# Patient Record
Sex: MALE | Race: WHITE | NOT HISPANIC OR LATINO | Employment: UNEMPLOYED | URBAN - NONMETROPOLITAN AREA
[De-identification: names, ages, dates, MRNs, and addresses within clinical notes are randomized per-mention and may not be internally consistent; named-entity substitution may affect disease eponyms.]

---

## 2022-07-06 ENCOUNTER — OFFICE VISIT (OUTPATIENT)
Dept: FAMILY MEDICINE | Facility: CLINIC | Age: 37
End: 2022-07-06
Payer: OTHER GOVERNMENT

## 2022-07-06 VITALS
HEIGHT: 74 IN | WEIGHT: 179 LBS | SYSTOLIC BLOOD PRESSURE: 130 MMHG | BODY MASS INDEX: 22.97 KG/M2 | DIASTOLIC BLOOD PRESSURE: 80 MMHG | TEMPERATURE: 98 F | OXYGEN SATURATION: 99 % | HEART RATE: 77 BPM

## 2022-07-06 DIAGNOSIS — D17.0 LIPOMA OF NECK: Primary | ICD-10-CM

## 2022-07-06 DIAGNOSIS — M67.442 GANGLION CYST OF TENDON SHEATH OF LEFT HAND: ICD-10-CM

## 2022-07-06 DIAGNOSIS — F43.10 PTSD (POST-TRAUMATIC STRESS DISORDER): ICD-10-CM

## 2022-07-06 PROCEDURE — 99203 OFFICE O/P NEW LOW 30 MIN: CPT | Mod: ,,, | Performed by: NURSE PRACTITIONER

## 2022-07-06 PROCEDURE — 99203 PR OFFICE/OUTPT VISIT, NEW, LEVL III, 30-44 MIN: ICD-10-PCS | Mod: ,,, | Performed by: NURSE PRACTITIONER

## 2022-07-06 RX ORDER — OMEPRAZOLE 20 MG/1
20 CAPSULE, DELAYED RELEASE ORAL DAILY
COMMUNITY
Start: 2022-02-16 | End: 2023-04-12 | Stop reason: SDUPTHER

## 2022-07-06 RX ORDER — METHOCARBAMOL 500 MG/1
500 TABLET, FILM COATED ORAL DAILY PRN
COMMUNITY
Start: 2022-05-03 | End: 2023-04-12 | Stop reason: SDUPTHER

## 2022-07-06 RX ORDER — GABAPENTIN 600 MG/1
1 TABLET ORAL NIGHTLY
COMMUNITY
Start: 2022-05-03 | End: 2023-04-12 | Stop reason: SDUPTHER

## 2022-07-06 RX ORDER — SILDENAFIL 100 MG/1
100 TABLET, FILM COATED ORAL
COMMUNITY
Start: 2022-05-02 | End: 2022-12-28 | Stop reason: SDUPTHER

## 2022-07-06 RX ORDER — VALACYCLOVIR HYDROCHLORIDE 500 MG/1
TABLET, FILM COATED ORAL
COMMUNITY
Start: 2022-02-23 | End: 2022-12-28 | Stop reason: SDUPTHER

## 2022-07-06 RX ORDER — CARBAMAZEPINE 200 MG/1
1 TABLET ORAL 2 TIMES DAILY
COMMUNITY
Start: 2021-11-10 | End: 2022-12-28 | Stop reason: SDUPTHER

## 2022-07-07 NOTE — PROGRESS NOTES
JUAN Sifuentes   [facility]  [id number] [place] [address]  [phone]      PATIENT NAME: Garrett Wright  : 1985  DATE: 22  MRN: [id number]      Billing Provider: JUAN Sifuentes  Level of Service:   Patient PCP Information     Provider PCP Type    JUAN Sifuentes General          Reason for Visit / Chief Complaint: Establish Care, Hand Pain (L hand cyst ), and Referral (Surgeon in [place] for cyst on hand and psychologist for ptsd )       Update PCP  Update Chief Complaint         History of Present Illness / Problem Focused Workflow       37 year old male presents with complaint of cyst to left hand near thumb for several months   He also has lipoma of the left side of neck   Reports cyst is causing pain with movement   He has been seen by surgeon in [place] who has agreed to remove cyst  Also wants referral for psychology related to PTSD    Hx of PTSD, daily smoker      Review of Systems     Review of Systems   Constitutional: Negative for fatigue and fever.   HENT: Negative for congestion.    Eyes: Negative for visual disturbance.   Respiratory: Negative for cough and shortness of breath.    Cardiovascular: Negative for chest pain.   Gastrointestinal: Negative for abdominal pain, diarrhea and nausea.   Endocrine: Negative for cold intolerance and heat intolerance.   Musculoskeletal: Negative for gait problem.   Neurological: Negative for dizziness, weakness and headaches.   Psychiatric/Behavioral: Positive for sleep disturbance. The patient is nervous/anxious.        Medical / Social / Family History     Past Medical History:   Diagnosis Date    PTSD (post-traumatic stress disorder)        History reviewed. No pertinent surgical history.    Social History    reports that he has been smoking. He has been smoking about 0.25 packs per day. He has never used smokeless tobacco. He reports previous alcohol use. He reports current drug use.  Drug: Marijuana.    Family History  MrBandar's family history includes Cancer in his mother.    Medications and Allergies     Medications  Outpatient Medications Marked as Taking for the 7/6/22 encounter (Office Visit) with JUAN Sifuentes   Medication Sig Dispense Refill    carBAMazepine (TEGRETOL) 200 mg tablet Take 1 tablet by mouth 2 (two) times daily.      gabapentin (NEURONTIN) 600 MG tablet Take 1 tablet by mouth nightly.      sildenafiL (VIAGRA) 100 MG tablet Take 100 mg by mouth.      valACYclovir (VALTREX) 500 MG tablet TAKE ONE TABLET BY MOUTH DAILY FOR VIRAL INFECTION         Allergies  Review of patient's allergies indicates:  No Known Allergies    Physical Examination     Vitals:    07/06/22 1443   BP: 130/80   Pulse: 77   Temp: 98 °F (36.7 °C)     Physical Exam  HENT:      Head: Normocephalic.      Nose: Nose normal. No congestion.      Mouth/Throat:      Mouth: Mucous membranes are moist.   Neck:      Comments: Lipoma of left side of neck  Pulmonary:      Effort: Pulmonary effort is normal. No respiratory distress.   Abdominal:      General: Bowel sounds are normal.      Palpations: Abdomen is soft.   Musculoskeletal:         General: Swelling present. No tenderness. Normal range of motion.      Cervical back: Neck supple.      Comments: Pain to left hand; cyst near thumb   Skin:     General: Skin is warm.   Neurological:      Mental Status: He is alert and oriented to person, place, and time.           Imaging / Labs     No visits with results within 1 Day(s) from this visit.   Latest known visit with results is:   No results found for any previous visit.     No image results found.      Assessment and Plan (including Health Maintenance)      Problem List  Smart Sets  Document Outside HM   :    Health Maintenance Due   Topic Date Due    Hepatitis C Screening  Never done    Lipid Panel  Never done    COVID-19 Vaccine (1) Never done    HIV Screening  Never done    TETANUS VACCINE  Never done     Pneumococcal Vaccines (Age 0-64) (2 - PCV) 03/12/2005       Problem List Items Addressed This Visit        Oncology    Lipoma of neck - Primary    Relevant Orders    Ambulatory referral/consult to General Surgery       Orthopedic    Ganglion cyst of tendon sheath of left hand    Relevant Orders    Ambulatory referral/consult to General Surgery        referral for general surgery and psychology  Follow up as needed    Health Maintenance Topics with due status: Not Due       Topic Last Completion Date    Influenza Vaccine 09/07/2020             Signature:  JUAN Sifuentes  [address] [address]  [phone]    Date of encounter: 7/6/22

## 2022-08-30 ENCOUNTER — OFFICE VISIT (OUTPATIENT)
Dept: SURGERY | Facility: CLINIC | Age: 37
End: 2022-08-30
Attending: SURGERY
Payer: OTHER GOVERNMENT

## 2022-08-30 ENCOUNTER — HOSPITAL ENCOUNTER (OUTPATIENT)
Dept: RADIOLOGY | Facility: HOSPITAL | Age: 37
Discharge: HOME | End: 2022-08-30
Attending: ORTHOPAEDIC SURGERY
Payer: OTHER GOVERNMENT

## 2022-08-30 DIAGNOSIS — D17.0 LIPOMA OF NECK: ICD-10-CM

## 2022-08-30 DIAGNOSIS — M67.442 GANGLION CYST OF TENDON SHEATH OF LEFT HAND: Primary | ICD-10-CM

## 2022-08-30 DIAGNOSIS — M67.442 GANGLION CYST OF TENDON SHEATH OF LEFT HAND: ICD-10-CM

## 2022-08-30 PROCEDURE — 99203 OFFICE O/P NEW LOW 30 MIN: CPT | Mod: S$PBB,,, | Performed by: SURGERY

## 2022-08-30 PROCEDURE — 99212 OFFICE O/P EST SF 10 MIN: CPT | Mod: PBBFAC | Performed by: SURGERY

## 2022-08-30 PROCEDURE — 99203 PR OFFICE/OUTPT VISIT, NEW, LEVL III, 30-44 MIN: ICD-10-PCS | Mod: S$PBB,,, | Performed by: SURGERY

## 2022-08-30 PROCEDURE — 73130 X-RAY EXAM OF HAND: CPT | Mod: TC,LT

## 2022-08-31 NOTE — PROGRESS NOTES
Subjective:       Patient ID: Garrett Wright is a 37 y.o. male.    Chief Complaint: Cyst (Left neck, left hand and right elbow)    37-year-old male patient who presents with complaint of left hand swelling near the thenar eminence on both the palmar and dorsal side.  He states that it limits his mobility of the thumb and use of the right hand.  The swelling has been present for several months, and he wishes to have something done with it.  [facility] though the primary reason for visit with me was stated to be a soft tissue swelling of the neck.    Cyst    family history includes Cancer in his mother.  Past Medical History:   Diagnosis Date    PTSD (post-traumatic stress disorder)       History reviewed. No pertinent surgical history.    reports that he has been smoking. He has been smoking an average of .25 packs per day. He has never used smokeless tobacco. He reports that he does not currently use alcohol. He reports current drug use. Drug: Marijuana.     Review of Systems   All other systems reviewed and are negative.       Objective:      There were no vitals taken for this visit.   Physical Exam  Constitutional:       General: He is not in acute distress.     Appearance: He is normal weight.   HENT:      Nose: Nose normal.   Eyes:      General: No scleral icterus.  Cardiovascular:      Rate and Rhythm: Normal rate and regular rhythm.      Pulses: Normal pulses.   Pulmonary:      Breath sounds: Normal breath sounds.   Abdominal:      General: There is no distension.      Palpations: Abdomen is soft. There is no mass.      Hernia: No hernia is present.   Musculoskeletal:         General: No swelling or tenderness.      Comments: Swelling of the thenar eminence toward the wrist and carpometacarpal joint of the thumb.  There is some swelling noted on the dorsum of the hand in this location as well.   Lymphadenopathy:      Cervical: No cervical adenopathy.   Skin:     Comments: 1.5  cm sebaceous cyst left posterior neck   Neurological:      General: No focal deficit present.      Mental Status: He is alert.      Motor: No weakness.   Psychiatric:         Mood and Affect: Mood normal.       Assessment/Plan:         Ganglion cyst of tendon sheath of left hand    Lipoma of neck         Problem List Items Addressed This Visit          Oncology    Lipoma of neck    Current Assessment & Plan     Can be addressed when he has surgery on hand            Orthopedic    Ganglion cyst of tendon sheath of left hand - Primary    Current Assessment & Plan     Refer to Ortho for further management

## 2022-11-28 ENCOUNTER — TELEPHONE (OUTPATIENT)
Dept: FAMILY MEDICINE | Facility: CLINIC | Age: 37
End: 2022-11-28
Payer: OTHER GOVERNMENT

## 2022-11-28 NOTE — TELEPHONE ENCOUNTER
----- Message from Radha King sent at 11/28/2022  3:29 PM CST -----  Pt need VALACYCLOVIR. NEED IT CALLED TO [facility]. HE NEED IT ASAP. IF ANY QUESTION CALL [phone].

## 2022-12-28 ENCOUNTER — OFFICE VISIT (OUTPATIENT)
Dept: FAMILY MEDICINE | Facility: CLINIC | Age: 37
End: 2022-12-28
Payer: OTHER GOVERNMENT

## 2022-12-28 VITALS
BODY MASS INDEX: 28.76 KG/M2 | HEIGHT: 73 IN | SYSTOLIC BLOOD PRESSURE: 130 MMHG | OXYGEN SATURATION: 98 % | DIASTOLIC BLOOD PRESSURE: 70 MMHG | WEIGHT: 217 LBS | HEART RATE: 109 BPM | TEMPERATURE: 99 F | RESPIRATION RATE: 20 BRPM

## 2022-12-28 DIAGNOSIS — R09.81 HEAD CONGESTION: ICD-10-CM

## 2022-12-28 DIAGNOSIS — Z76.0 ENCOUNTER FOR MEDICATION REFILL: ICD-10-CM

## 2022-12-28 DIAGNOSIS — B34.9 VIRAL DISEASE: ICD-10-CM

## 2022-12-28 DIAGNOSIS — N52.9 ERECTILE DYSFUNCTION, UNSPECIFIED ERECTILE DYSFUNCTION TYPE: ICD-10-CM

## 2022-12-28 DIAGNOSIS — M62.838 MUSCLE SPASM: ICD-10-CM

## 2022-12-28 DIAGNOSIS — Z13.220 ENCOUNTER FOR SCREENING FOR LIPID DISORDER: ICD-10-CM

## 2022-12-28 DIAGNOSIS — J32.9 OTHER SINUSITIS, UNSPECIFIED CHRONICITY: Primary | ICD-10-CM

## 2022-12-28 PROCEDURE — 99213 PR OFFICE/OUTPT VISIT, EST, LEVL III, 20-29 MIN: ICD-10-PCS | Mod: 25,,, | Performed by: NURSE PRACTITIONER

## 2022-12-28 PROCEDURE — 99213 OFFICE O/P EST LOW 20 MIN: CPT | Mod: 25,,, | Performed by: NURSE PRACTITIONER

## 2022-12-28 PROCEDURE — 96372 PR INJECTION,THERAP/PROPH/DIAG2ST, IM OR SUBCUT: ICD-10-PCS | Mod: ,,, | Performed by: NURSE PRACTITIONER

## 2022-12-28 PROCEDURE — 96372 THER/PROPH/DIAG INJ SC/IM: CPT | Mod: ,,, | Performed by: NURSE PRACTITIONER

## 2022-12-28 RX ORDER — CARBAMAZEPINE 200 MG/1
200 TABLET ORAL 2 TIMES DAILY
Qty: 180 TABLET | Refills: 1 | Status: SHIPPED | OUTPATIENT
Start: 2022-12-28 | End: 2023-04-12

## 2022-12-28 RX ORDER — SILDENAFIL 100 MG/1
100 TABLET, FILM COATED ORAL
Qty: 30 TABLET | Refills: 2 | Status: SHIPPED | OUTPATIENT
Start: 2022-12-28 | End: 2023-04-12 | Stop reason: SDUPTHER

## 2022-12-28 RX ORDER — DEXAMETHASONE SODIUM PHOSPHATE 4 MG/ML
4 INJECTION, SOLUTION INTRA-ARTICULAR; INTRALESIONAL; INTRAMUSCULAR; INTRAVENOUS; SOFT TISSUE
Status: COMPLETED | OUTPATIENT
Start: 2022-12-28 | End: 2022-12-28

## 2022-12-28 RX ORDER — AZITHROMYCIN 250 MG/1
TABLET, FILM COATED ORAL
Qty: 6 TABLET | Refills: 0 | Status: SHIPPED | OUTPATIENT
Start: 2022-12-28 | End: 2023-01-02

## 2022-12-28 RX ORDER — CEFTRIAXONE 1 G/1
1 INJECTION, POWDER, FOR SOLUTION INTRAMUSCULAR; INTRAVENOUS
Status: COMPLETED | OUTPATIENT
Start: 2022-12-28 | End: 2022-12-28

## 2022-12-28 RX ORDER — VALACYCLOVIR HYDROCHLORIDE 500 MG/1
500 TABLET, FILM COATED ORAL DAILY
Qty: 90 TABLET | Refills: 1 | Status: SHIPPED | OUTPATIENT
Start: 2022-12-28 | End: 2023-04-12 | Stop reason: SDUPTHER

## 2022-12-28 RX ADMIN — DEXAMETHASONE SODIUM PHOSPHATE 4 MG: 4 INJECTION, SOLUTION INTRA-ARTICULAR; INTRALESIONAL; INTRAMUSCULAR; INTRAVENOUS; SOFT TISSUE at 02:12

## 2022-12-28 RX ADMIN — CEFTRIAXONE 1 G: 1 INJECTION, POWDER, FOR SOLUTION INTRAMUSCULAR; INTRAVENOUS at 02:12

## 2022-12-28 NOTE — PROGRESS NOTES
JUAN Sifuentes   [address] [address]  [phone]      PATIENT NAME: Garrett Wright  : 1985  DATE: 22  MRN: [id number]      Billing Provider: JUAN Sifuentes  Level of Service:   Patient PCP Information       Provider PCP Type    JUAN Sifuentes General            Reason for Visit / Chief Complaint: Wrist Pain (Left wrist pain./Interested in referral for sx.), Medication Refill, Referral (Requesting referral to immediate care for ptsd psych.), and Nasal Congestion (X2 wks./Denies any other symptoms)       Update PCP  Update Chief Complaint         History of Present Illness / Problem Focused Workflow       37 year old male presents for medication refills  Father requests referral to immediate care for ptsd   Complaints of head and nasal congestion for several weeks  He has been in home of kate in [place], [place]  Reports he has been doing well while staying there; he is now home with family    He has appointment in [place] for follow up and treatment related to left wrist    Hx of PTSD, daily smoker      Review of Systems     Review of Systems   Constitutional:  Negative for fatigue and fever.   HENT:  Negative for congestion.    Eyes:  Negative for visual disturbance.   Respiratory:  Negative for cough and shortness of breath.    Cardiovascular:  Negative for chest pain.   Gastrointestinal:  Negative for abdominal pain, diarrhea and nausea.   Endocrine: Negative for cold intolerance and heat intolerance.   Musculoskeletal:  Negative for gait problem.   Neurological:  Negative for dizziness, weakness and headaches.   Psychiatric/Behavioral:  Positive for sleep disturbance. The patient is nervous/anxious.      Medical / Social / Family History     Past Medical History:   Diagnosis Date    Male erectile dysfunction, unspecified     PTSD (post-traumatic stress disorder)        History reviewed. No pertinent surgical  history.    Social History    reports that he has been smoking cigarettes. He has been smoking an average of .25 packs per day. He has never used smokeless tobacco. He reports that he does not currently use alcohol. He reports current drug use. Drug: Marijuana.    Family History  's family history includes Cancer in his mother.    Medications and Allergies     Medications  Outpatient Medications Marked as Taking for the 12/28/22 encounter (Office Visit) with JUAN Sifuentes   Medication Sig Dispense Refill    methocarbamoL (ROBAXIN) 500 MG Tab Take 500 mg by mouth daily as needed.      [DISCONTINUED] sildenafiL (VIAGRA) 100 MG tablet Take 100 mg by mouth.      [DISCONTINUED] valACYclovir (VALTREX) 500 MG tablet TAKE ONE TABLET BY MOUTH DAILY FOR VIRAL INFECTION       Current Facility-Administered Medications for the 12/28/22 encounter (Office Visit) with JUAN Sifuentes   Medication Dose Route Frequency Provider Last Rate Last Admin    [COMPLETED] cefTRIAXone injection 1 g  1 g Intramuscular 1 time in Clinic/HOD JUAN Sifuentes   1 g at 12/28/22 1400    [COMPLETED] dexAMETHasone injection 4 mg  4 mg Intramuscular 1 time in Clinic/HOD JUAN Sifuentes   4 mg at 12/28/22 1400       Allergies  Review of patient's allergies indicates:  No Known Allergies    Physical Examination     Vitals:    12/28/22 1318   BP: 130/70   Pulse: 109   Resp: 20   Temp: 98.9 °F (37.2 °C)     Physical Exam  HENT:      Head: Normocephalic.      Nose: Nose normal. No congestion.      Mouth/Throat:      Mouth: Mucous membranes are moist.   Neck:      Comments: Lipoma of left side of neck  Pulmonary:      Effort: Pulmonary effort is normal. No respiratory distress.   Abdominal:      General: Bowel sounds are normal.      Palpations: Abdomen is soft.   Musculoskeletal:         General: Swelling present. No tenderness. Normal range of motion.      Cervical back: Neck supple.      Comments: Pain to left hand; cyst near thumb    Skin:     General: Skin is warm.   Neurological:      Mental Status: He is alert and oriented to person, place, and time.         Imaging / Labs     No visits with results within 1 Day(s) from this visit.   Latest known visit with results is:   No results found for any previous visit.     X-Ray Hand 3 view Left  See Procedure Notes for results.     IMPRESSION: Please see Ortho procedure notes for report.      This procedure was auto-finalized by: Virtual Radiologist      Assessment and Plan (including Health Maintenance)      Problem List  Smart Sets  Document Outside HM   :    Health Maintenance Due   Topic Date Due    Hepatitis C Screening  Never done    Lipid Panel  Never done    COVID-19 Vaccine (1) Never done    HIV Screening  Never done    TETANUS VACCINE  Never done    Pneumococcal Vaccines (Age 0-64) (2 - PCV) 03/12/2005    Influenza Vaccine (1) 09/01/2022       Problem List Items Addressed This Visit    None  Visit Diagnoses       Other sinusitis, unspecified chronicity    -  Primary    Relevant Medications    cefTRIAXone injection 1 g (Completed)    dexAMETHasone injection 4 mg (Completed)    Erectile dysfunction, unspecified erectile dysfunction type        Relevant Medications    sildenafiL (VIAGRA) 100 MG tablet    Other Relevant Orders    CBC Auto Differential    Comprehensive Metabolic Panel    Viral disease        Relevant Medications    valACYclovir (VALTREX) 500 MG tablet    Muscle spasm        Relevant Medications    carBAMazepine (TEGRETOL) 200 mg tablet    Encounter for screening for lipid disorder        Relevant Orders    Lipid Panel    Head congestion        Relevant Medications    dexAMETHasone injection 4 mg (Completed)    azithromycin (Z-JEMMA) 250 MG tablet    Encounter for medication refill            Treat for sinusitis today  Medication refills  Will send referral after patient or father gets information for internal med clinic  Increase fluids as tolerated  Labs today; will treat as  indicated  Follow up about 6 mo          Signature:  JUAN Sifuentes  [address] [place] [address]  [phone]    Date of encounter: 12/28/22

## 2023-02-03 ENCOUNTER — HOSPITAL ENCOUNTER (EMERGENCY)
Facility: HOSPITAL | Age: 38
Discharge: HOME | End: 2023-02-03
Attending: EMERGENCY MEDICINE
Payer: OTHER GOVERNMENT

## 2023-02-03 VITALS
HEIGHT: 73 IN | RESPIRATION RATE: 18 BRPM | SYSTOLIC BLOOD PRESSURE: 131 MMHG | HEART RATE: 98 BPM | OXYGEN SATURATION: 100 % | TEMPERATURE: 98 F | WEIGHT: 205 LBS | BODY MASS INDEX: 27.17 KG/M2 | DIASTOLIC BLOOD PRESSURE: 82 MMHG
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
## 2023-02-03 LAB
ABORH RETYPE: NORMAL
ALBUMIN SERPL BCP-MCNC: 4.1 G/DL (ref 3.5–5)
ALBUMIN/GLOB SERPL: 1.4 {RATIO}
ALP SERPL-CCNC: 77 U/L (ref 45–115)
ALT SERPL W P-5'-P-CCNC: 33 U/L (ref 16–61)
ANION GAP SERPL CALCULATED.3IONS-SCNC: 13 MMOL/L (ref 7–16)
APTT PPP: 27.8 SECONDS (ref 25.2–37.3)
AST SERPL W P-5'-P-CCNC: 36 U/L (ref 15–37)
BASOPHILS # BLD AUTO: 0.04 K/UL (ref 0–0.2)
BASOPHILS NFR BLD AUTO: 0.4 % (ref 0–1)
BILIRUB SERPL-MCNC: 0.7 MG/DL (ref ?–1.2)
BUN SERPL-MCNC: 20 MG/DL (ref 7–18)
BUN/CREAT SERPL: 18 (ref 6–20)
CALCIUM SERPL-MCNC: 8.9 MG/DL (ref 8.5–10.1)
CHLORIDE SERPL-SCNC: 100 MMOL/L (ref 98–107)
CO2 SERPL-SCNC: 27 MMOL/L (ref 21–32)
CREAT SERPL-MCNC: 1.13 MG/DL (ref 0.7–1.3)
DIFFERENTIAL METHOD BLD: ABNORMAL
EGFR (NO RACE VARIABLE) (RUSH/TITUS): 85 ML/MIN/1.73M²
EOSINOPHIL # BLD AUTO: 0.15 K/UL (ref 0–0.5)
EOSINOPHIL NFR BLD AUTO: 1.7 % (ref 1–4)
ERYTHROCYTE [DISTWIDTH] IN BLOOD BY AUTOMATED COUNT: 12.7 % (ref 11.5–14.5)
ETHANOL, BLOOD (CATEGORY): NOT DETECTED
GLOBULIN SER-MCNC: 2.9 G/DL (ref 2–4)
GLUCOSE SERPL-MCNC: 124 MG/DL (ref 74–106)
HCT VFR BLD AUTO: 40.9 % (ref 40–54)
HGB BLD-MCNC: 14 G/DL (ref 13.5–18)
IMM GRANULOCYTES # BLD AUTO: 0.03 K/UL (ref 0–0.04)
IMM GRANULOCYTES NFR BLD: 0.3 % (ref 0–0.4)
INDIRECT COOMBS: NORMAL
INR BLD: 1
LACTATE SERPL-SCNC: 1 MMOL/L (ref 0.4–2)
LYMPHOCYTES # BLD AUTO: 1.44 K/UL (ref 1–4.8)
LYMPHOCYTES NFR BLD AUTO: 16 % (ref 27–41)
MCH RBC QN AUTO: 30.3 PG (ref 27–31)
MCHC RBC AUTO-ENTMCNC: 34.2 G/DL (ref 32–36)
MCV RBC AUTO: 88.5 FL (ref 80–96)
MONOCYTES # BLD AUTO: 0.91 K/UL (ref 0–0.8)
MONOCYTES NFR BLD AUTO: 10.1 % (ref 2–6)
MPC BLD CALC-MCNC: 9.4 FL (ref 9.4–12.4)
NEUTROPHILS # BLD AUTO: 6.43 K/UL (ref 1.8–7.7)
NEUTROPHILS NFR BLD AUTO: 71.5 % (ref 53–65)
NRBC # BLD AUTO: 0 X10E3/UL
NRBC, AUTO (.00): 0 %
PLATELET # BLD AUTO: 279 K/UL (ref 150–400)
POTASSIUM SERPL-SCNC: 4.1 MMOL/L (ref 3.5–5.1)
PROT SERPL-MCNC: 7 G/DL (ref 6.4–8.2)
PROTHROMBIN TIME: 12.8 SECONDS (ref 11.7–14.7)
RBC # BLD AUTO: 4.62 M/UL (ref 4.6–6.2)
RH BLD: NORMAL
SODIUM SERPL-SCNC: 136 MMOL/L (ref 136–145)
WBC # BLD AUTO: 9 K/UL (ref 4.5–11)

## 2023-02-03 PROCEDURE — 86900 BLOOD TYPING SEROLOGIC ABO: CPT | Performed by: EMERGENCY MEDICINE

## 2023-02-03 PROCEDURE — 80053 COMPREHEN METABOLIC PANEL: CPT | Performed by: EMERGENCY MEDICINE

## 2023-02-03 PROCEDURE — 85610 PROTHROMBIN TIME: CPT | Performed by: EMERGENCY MEDICINE

## 2023-02-03 PROCEDURE — 85730 THROMBOPLASTIN TIME PARTIAL: CPT | Performed by: EMERGENCY MEDICINE

## 2023-02-03 PROCEDURE — 85025 COMPLETE CBC W/AUTO DIFF WBC: CPT | Performed by: EMERGENCY MEDICINE

## 2023-02-03 PROCEDURE — 99285 PR EMERGENCY DEPT VISIT,LEVEL V: ICD-10-PCS | Mod: ,,, | Performed by: EMERGENCY MEDICINE

## 2023-02-03 PROCEDURE — G0390 TRAUMA RESPONS W/HOSP CRITI: HCPCS | Mod: TRAUMA2

## 2023-02-03 PROCEDURE — 99285 EMERGENCY DEPT VISIT HI MDM: CPT | Mod: ,,, | Performed by: EMERGENCY MEDICINE

## 2023-02-03 PROCEDURE — 83605 ASSAY OF LACTIC ACID: CPT | Performed by: EMERGENCY MEDICINE

## 2023-02-03 PROCEDURE — 25000003 PHARM REV CODE 250: Performed by: EMERGENCY MEDICINE

## 2023-02-03 PROCEDURE — 99284 EMERGENCY DEPT VISIT MOD MDM: CPT | Mod: 25

## 2023-02-03 PROCEDURE — 82077 ASSAY SPEC XCP UR&BREATH IA: CPT | Performed by: EMERGENCY MEDICINE

## 2023-02-03 RX ORDER — HYDROCODONE BITARTRATE AND ACETAMINOPHEN 10; 325 MG/1; MG/1
1 TABLET ORAL
Status: COMPLETED | OUTPATIENT
Start: 2023-02-03 | End: 2023-02-03

## 2023-02-03 RX ORDER — HYDROCODONE BITARTRATE AND ACETAMINOPHEN 7.5; 325 MG/1; MG/1
1 TABLET ORAL EVERY 6 HOURS PRN
Qty: 5 TABLET | Refills: 0 | Status: SHIPPED | OUTPATIENT
Start: 2023-02-03 | End: 2023-04-12

## 2023-02-03 RX ORDER — SULFAMETHOXAZOLE AND TRIMETHOPRIM 800; 160 MG/1; MG/1
1 TABLET ORAL 2 TIMES DAILY
Qty: 14 TABLET | Refills: 0 | Status: SHIPPED | OUTPATIENT
Start: 2023-02-03 | End: 2023-02-10

## 2023-02-03 RX ADMIN — HYDROCODONE BITARTRATE AND ACETAMINOPHEN 1 TABLET: 10; 325 TABLET ORAL at 01:02

## 2023-02-03 RX ADMIN — BACITRACIN ZINC, NEOMYCIN, POLYMYXIN B 1 EACH: 400; 3.5; 5 OINTMENT TOPICAL at 01:02
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
## 2023-02-03 NOTE — DISCHARGE INSTRUCTIONS
Use crutches and knee immobilizer as needed.  Follow up with the ER as needed.  Start the antibiotics.  Use ibuprofen and Tylenol and ice as needed.  Only use prescription narcotic if other measures are not controlling pain satisfactorily

## 2023-04-12 ENCOUNTER — OFFICE VISIT (OUTPATIENT)
Dept: FAMILY MEDICINE | Facility: CLINIC | Age: 38
End: 2023-04-12
Payer: OTHER GOVERNMENT

## 2023-04-12 VITALS
BODY MASS INDEX: 26 KG/M2 | WEIGHT: 196.19 LBS | HEART RATE: 78 BPM | SYSTOLIC BLOOD PRESSURE: 138 MMHG | HEIGHT: 73 IN | RESPIRATION RATE: 18 BRPM | TEMPERATURE: 98 F | OXYGEN SATURATION: 99 % | DIASTOLIC BLOOD PRESSURE: 86 MMHG

## 2023-04-12 DIAGNOSIS — N52.9 ERECTILE DYSFUNCTION, UNSPECIFIED ERECTILE DYSFUNCTION TYPE: ICD-10-CM

## 2023-04-12 DIAGNOSIS — K21.9 GASTROESOPHAGEAL REFLUX DISEASE, UNSPECIFIED WHETHER ESOPHAGITIS PRESENT: Primary | ICD-10-CM

## 2023-04-12 DIAGNOSIS — G62.9 NEUROPATHY: ICD-10-CM

## 2023-04-12 DIAGNOSIS — F43.10 PTSD (POST-TRAUMATIC STRESS DISORDER): ICD-10-CM

## 2023-04-12 DIAGNOSIS — B34.9 VIRAL DISEASE: ICD-10-CM

## 2023-04-12 DIAGNOSIS — M62.838 MUSCLE SPASM: ICD-10-CM

## 2023-04-12 PROCEDURE — 99214 PR OFFICE/OUTPT VISIT, EST, LEVL IV, 30-39 MIN: ICD-10-PCS | Mod: ,,, | Performed by: NURSE PRACTITIONER

## 2023-04-12 PROCEDURE — 99214 OFFICE O/P EST MOD 30 MIN: CPT | Mod: ,,, | Performed by: NURSE PRACTITIONER

## 2023-04-12 RX ORDER — OMEPRAZOLE 20 MG/1
20 CAPSULE, DELAYED RELEASE ORAL DAILY
Qty: 90 CAPSULE | Refills: 1 | Status: SHIPPED | OUTPATIENT
Start: 2023-04-12 | End: 2023-10-17 | Stop reason: SDUPTHER

## 2023-04-12 RX ORDER — VALACYCLOVIR HYDROCHLORIDE 500 MG/1
500 TABLET, FILM COATED ORAL DAILY
Qty: 90 TABLET | Refills: 1 | Status: SHIPPED | OUTPATIENT
Start: 2023-04-12 | End: 2023-10-17 | Stop reason: SDUPTHER

## 2023-04-12 RX ORDER — SILDENAFIL 100 MG/1
100 TABLET, FILM COATED ORAL
Qty: 30 TABLET | Refills: 2 | Status: SHIPPED | OUTPATIENT
Start: 2023-04-12

## 2023-04-12 RX ORDER — METHOCARBAMOL 500 MG/1
500 TABLET, FILM COATED ORAL DAILY PRN
Qty: 30 TABLET | Refills: 2 | Status: SHIPPED | OUTPATIENT
Start: 2023-04-12

## 2023-04-12 RX ORDER — GABAPENTIN 600 MG/1
600 TABLET ORAL NIGHTLY
Qty: 90 TABLET | Refills: 1 | Status: SHIPPED | OUTPATIENT
Start: 2023-04-12 | End: 2023-10-17 | Stop reason: SDUPTHER
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
## 2023-08-28 DIAGNOSIS — M25.511 RIGHT SHOULDER PAIN: Primary | ICD-10-CM

## 2023-09-07 DIAGNOSIS — M25.511 RIGHT SHOULDER PAIN, UNSPECIFIED CHRONICITY: Primary | ICD-10-CM

## 2023-09-11 ENCOUNTER — OFFICE VISIT (OUTPATIENT)
Dept: ORTHOPEDICS | Facility: CLINIC | Age: 38
End: 2023-09-11
Payer: OTHER GOVERNMENT

## 2023-09-11 ENCOUNTER — HOSPITAL ENCOUNTER (OUTPATIENT)
Dept: RADIOLOGY | Facility: HOSPITAL | Age: 38
Discharge: HOME | End: 2023-09-11
Attending: ORTHOPAEDIC SURGERY
Payer: OTHER GOVERNMENT

## 2023-09-11 DIAGNOSIS — M25.511 RIGHT SHOULDER PAIN: ICD-10-CM

## 2023-09-11 DIAGNOSIS — S46.011A TRAUMATIC COMPLETE TEAR OF RIGHT ROTATOR CUFF, INITIAL ENCOUNTER: Primary | ICD-10-CM

## 2023-09-11 DIAGNOSIS — M25.511 RIGHT SHOULDER PAIN, UNSPECIFIED CHRONICITY: ICD-10-CM

## 2023-09-11 PROCEDURE — 73030 X-RAY EXAM OF SHOULDER: CPT | Mod: 26,RT,, | Performed by: ORTHOPAEDIC SURGERY

## 2023-09-11 PROCEDURE — 99213 OFFICE O/P EST LOW 20 MIN: CPT | Mod: PBBFAC | Performed by: ORTHOPAEDIC SURGERY

## 2023-09-11 PROCEDURE — 73030 X-RAY EXAM OF SHOULDER: CPT | Mod: TC,RT

## 2023-09-11 PROCEDURE — 99204 OFFICE O/P NEW MOD 45 MIN: CPT | Mod: S$PBB,,, | Performed by: ORTHOPAEDIC SURGERY

## 2023-09-11 PROCEDURE — 99204 PR OFFICE/OUTPT VISIT, NEW, LEVL IV, 45-59 MIN: ICD-10-PCS | Mod: S$PBB,,, | Performed by: ORTHOPAEDIC SURGERY

## 2023-09-11 PROCEDURE — 73030 XR SHOULDER COMPLETE 2 OR MORE VIEWS RIGHT: ICD-10-PCS | Mod: 26,RT,, | Performed by: ORTHOPAEDIC SURGERY

## 2023-09-11 NOTE — PROGRESS NOTES
CLINIC NOTE       Chief Complaint   Patient presents with    Right Shoulder - Pain        Garrett Wright is a 38 y.o. male seen today for evaluation of right shoulder pain.  Describes some initial right shoulder symptoms in 2006 and 2007 while in [place].  He did not seek attention through the  until 2009.  He is not had any surgical intervention.  Proximally a year ago he was roping a couch and felt a sudden painful pop in his right shoulder.  She is been experiencing weakness and shoulder pain with AP duction.  He underwent MRI examination of the right shoulder at [facility] on 08/21/2023.  The MRI shows moderate AC joint DJD.  There is a full-thickness retracted tear of the supraspinatus tendon with 2.6 cm retraction gap.  Is F component of night pain.  Past Medical History:   Diagnosis Date    Male erectile dysfunction, unspecified     PTSD (post-traumatic stress disorder)      Family History   Problem Relation Age of Onset    Cancer Mother      Current Outpatient Medications on File Prior to Visit   Medication Sig Dispense Refill    gabapentin (NEURONTIN) 600 MG tablet Take 1 tablet (600 mg total) by mouth nightly. 90 tablet 1    methocarbamoL (ROBAXIN) 500 MG Tab Take 1 tablet (500 mg total) by mouth daily as needed (shoulder pain). 30 tablet 2    omeprazole (PRILOSEC) 20 MG capsule Take 1 capsule (20 mg total) by mouth once daily. 90 capsule 1    sildenafiL (VIAGRA) 100 MG tablet Take 1 tablet (100 mg total) by mouth as needed for Erectile Dysfunction. 30 tablet 2    valACYclovir (VALTREX) 500 MG tablet Take 1 tablet (500 mg total) by mouth once daily. 90 tablet 1     No current facility-administered medications on file prior to visit.       ROS     There were no vitals filed for this visit.    No past surgical history on file.     Review of patient's allergies indicates:  No Known Allergies     Ortho Exam :  Well-developed well-nourished  male no acute  distress.  Alert oriented cooperative.  Neck is supple without JVD.  Breathing is regular nonlabored.  Skin is warm dry no lesions seen.  Exam of the right shoulder is normal contour.  He has near full motion of the right shoulder with pain at the extremes of AP duction internal rotation.  Positive supraspinatus test.  No crepitance or instability signs.    Radiographic Examination:  Right shoulder 09/11/2023    Technique:  Two views AP and lateral scapular    Findings:  Bones well mineralized.  Glenohumeral joint is located.  There is moderate AC joint DJD.  No evidence of fracture, dislocation or pathologic bone.  Impression:   See Above    Assessment and Plan  Patient Active Problem List    Diagnosis Date Noted    Gastroesophageal reflux disease 04/12/2023    Muscle spasm 04/12/2023    PTSD (post-traumatic stress disorder) 04/12/2023    Erectile dysfunction 04/12/2023    Lipoma of neck 07/06/2022    Ganglion cyst of tendon sheath of left hand 07/06/2022    Impression:  Impingement syndrome with acute full-thickness rotator cuff tear-right shoulder   Plan:  Patient offered right shoulder arthroscopic subacromial decompression/rotator cuff repair.  The procedure was shown with models.  Potential benefits and risks of surgery outlined to include but not limited to bleeding, infection, damage to blood vessels and nerves, need for further surgery, other risks and complications including even death the patient wished to proceed.  We discussed outpatient general/regional block anesthesia.  He was reportedly  disabled and retired.      Eb Mascorro M.D.

## 2023-09-20 NOTE — H&P
[facility] - Orthopedic Periop Services  Orthopedics  H&P    Patient Name: Garrett Wright  MRN: [id number]  Admission Date: (Not on file)  Primary Care Provider: Jacklyn Wolf FNP    Patient information was obtained from patient and ER records.     Subjective:     Principal Problem:Traumatic complete tear of right rotator cuff    Chief Complaint: No chief complaint on file.       HPI:   Chief complaint:  Right shoulder pain   History:Garrett Wright is a 38 y.o. male seen for evaluation of right shoulder pain.  Describes some initial right shoulder symptoms in 2006 and 2007 while in [place].  He did not seek attention through the  until 2009.  He is not had any surgical intervention.  Proximally a year ago he was roping a couch and felt a sudden painful pop in his right shoulder.  She is been experiencing weakness and shoulder pain with AP duction.  He underwent MRI examination of the right shoulder at [facility] on 08/21/2023.  The MRI shows moderate AC joint DJD.  There is a full-thickness retracted tear of the supraspinatus tendon with 2.6 cm retraction gap.  There is a component of night pain.  Impression:  Rotator cuff tear-right shoulder   Plan:  Right shoulder arthroscopic subacromial decompression/rotator cuff repair.  Outpatient general/regional block anesthesia discussed.  Potential benefits risks surgery outlined to include but not limited to bleeding, infection, damage to blood vessels and nerves, need for further surgery, other risks and complications including even death the patient wished to proceed.                Past Medical History:   Diagnosis Date    Male erectile dysfunction, unspecified     PTSD (post-traumatic stress disorder)        History reviewed. No pertinent surgical history.    Review of patient's allergies indicates:  No Known Allergies    No current facility-administered medications for this encounter.     Current Outpatient Medications    Medication Sig    gabapentin (NEURONTIN) 600 MG tablet Take 1 tablet (600 mg total) by mouth nightly.    methocarbamoL (ROBAXIN) 500 MG Tab Take 1 tablet (500 mg total) by mouth daily as needed (shoulder pain).    omeprazole (PRILOSEC) 20 MG capsule Take 1 capsule (20 mg total) by mouth once daily.    sildenafiL (VIAGRA) 100 MG tablet Take 1 tablet (100 mg total) by mouth as needed for Erectile Dysfunction.    valACYclovir (VALTREX) 500 MG tablet Take 1 tablet (500 mg total) by mouth once daily.     Family History       Problem Relation (Age of Onset)    Cancer Mother          Tobacco Use    Smoking status: Every Day     Current packs/day: 0.25     Types: Cigarettes    Smokeless tobacco: Never   Substance and Sexual Activity    Alcohol use: Not Currently    Drug use: Yes     Types: Marijuana    Sexual activity: Yes     Review of Systems   Constitutional: Negative.     Objective:     Vital Signs (Most Recent):    Vital Signs (24h Range):  BP: ()/()   Arterial Line BP: ()/()            There is no height or weight on file to calculate BMI.    No intake or output data in the 24 hours ending 09/20/23 1642     General    Vitals reviewed.  Constitutional: He is oriented to person, place, and time. He appears well-developed and well-nourished.   HENT:   Head: Normocephalic and atraumatic.   Eyes: EOM are normal. Pupils are equal, round, and reactive to light.   Neck: Neck supple.   Cardiovascular:  Normal rate, regular rhythm and normal heart sounds.            Pulmonary/Chest: Effort normal and breath sounds normal.   Abdominal: Soft. Bowel sounds are normal.   Neurological: He is alert and oriented to person, place, and time.   Psychiatric: He has a normal mood and affect. His behavior is normal.         Right Shoulder Exam     Tenderness   The patient is tender to palpation of the acromioclavicular joint, greater tuberosity and acromion.    Range of Motion   Active abduction:  abnormal   Passive abduction:   normal   Extension:  normal   Forward Flexion:  abnormal   Adduction: normal    Tests & Signs   Rotator Cuff Painful Arc/Range: moderate    Other   Sensation: normal    Left Shoulder Exam   Left shoulder exam is normal.       Muscle Strength   Right Upper Extremity   Supraspinatus: 4/5     Vascular Exam     Right Pulses      Radial:                    2+      Capillary Refill  Right Hand: normal capillary refill           Significant Labs: All pertinent labs within the past 24 hours have been reviewed.    Significant Imaging: I have reviewed all pertinent imaging results/findings.    Assessment/Plan:     No notes have been filed under this hospital service.  Service: Orthopedic Surgery      Eb Mascorro MD  Orthopedics  [facility] - Orthopedic Periop Services

## 2023-09-20 NOTE — SUBJECTIVE & OBJECTIVE
Past Medical History:   Diagnosis Date    Male erectile dysfunction, unspecified     PTSD (post-traumatic stress disorder)        History reviewed. No pertinent surgical history.    Review of patient's allergies indicates:  No Known Allergies    No current facility-administered medications for this encounter.     Current Outpatient Medications   Medication Sig    gabapentin (NEURONTIN) 600 MG tablet Take 1 tablet (600 mg total) by mouth nightly.    methocarbamoL (ROBAXIN) 500 MG Tab Take 1 tablet (500 mg total) by mouth daily as needed (shoulder pain).    omeprazole (PRILOSEC) 20 MG capsule Take 1 capsule (20 mg total) by mouth once daily.    sildenafiL (VIAGRA) 100 MG tablet Take 1 tablet (100 mg total) by mouth as needed for Erectile Dysfunction.    valACYclovir (VALTREX) 500 MG tablet Take 1 tablet (500 mg total) by mouth once daily.     Family History       Problem Relation (Age of Onset)    Cancer Mother          Tobacco Use    Smoking status: Every Day     Current packs/day: 0.25     Types: Cigarettes    Smokeless tobacco: Never   Substance and Sexual Activity    Alcohol use: Not Currently    Drug use: Yes     Types: Marijuana    Sexual activity: Yes     Review of Systems   Constitutional: Negative.     Objective:     Vital Signs (Most Recent):    Vital Signs (24h Range):  BP: ()/()   Arterial Line BP: ()/()            There is no height or weight on file to calculate BMI.    No intake or output data in the 24 hours ending 09/20/23 1642     General    Vitals reviewed.  Constitutional: He is oriented to person, place, and time. He appears well-developed and well-nourished.   HENT:   Head: Normocephalic and atraumatic.   Eyes: EOM are normal. Pupils are equal, round, and reactive to light.   Neck: Neck supple.   Cardiovascular:  Normal rate, regular rhythm and normal heart sounds.            Pulmonary/Chest: Effort normal and breath sounds normal.   Abdominal: Soft. Bowel sounds are normal.   Neurological: He is  alert and oriented to person, place, and time.   Psychiatric: He has a normal mood and affect. His behavior is normal.         Right Shoulder Exam     Tenderness   The patient is tender to palpation of the acromioclavicular joint, greater tuberosity and acromion.    Range of Motion   Active abduction:  abnormal   Passive abduction:  normal   Extension:  normal   Forward Flexion:  abnormal   Adduction: normal    Tests & Signs   Rotator Cuff Painful Arc/Range: moderate    Other   Sensation: normal    Left Shoulder Exam   Left shoulder exam is normal.       Muscle Strength   Right Upper Extremity   Supraspinatus: 4/5     Vascular Exam     Right Pulses      Radial:                    2+      Capillary Refill  Right Hand: normal capillary refill           Significant Labs: All pertinent labs within the past 24 hours have been reviewed.    Significant Imaging: I have reviewed all pertinent imaging results/findings.

## 2023-09-20 NOTE — HPI
Chief complaint:  Right shoulder pain   History:Garrett Wright is a 38 y.o. male seen for evaluation of right shoulder pain.  Describes some initial right shoulder symptoms in 2006 and 2007 while in [place].  He did not seek attention through the  until 2009.  He is not had any surgical intervention.  Proximally a year ago he was roping a couch and felt a sudden painful pop in his right shoulder.  She is been experiencing weakness and shoulder pain with AP duction.  He underwent MRI examination of the right shoulder at [facility] on 08/21/2023.  The MRI shows moderate AC joint DJD.  There is a full-thickness retracted tear of the supraspinatus tendon with 2.6 cm retraction gap.  There is a component of night pain.  Impression:  Rotator cuff tear-right shoulder   Plan:  Right shoulder arthroscopic subacromial decompression/rotator cuff repair.  Outpatient general/regional block anesthesia discussed.  Potential benefits risks surgery outlined to include but not limited to bleeding, infection, damage to blood vessels and nerves, need for further surgery, other risks and complications including even death the patient wished to proceed.

## 2023-09-21 ENCOUNTER — ANESTHESIA EVENT (OUTPATIENT)
Dept: SURGERY | Facility: HOSPITAL | Age: 38
End: 2023-09-21
Payer: OTHER GOVERNMENT

## 2023-09-21 ENCOUNTER — HOSPITAL ENCOUNTER (OUTPATIENT)
Facility: HOSPITAL | Age: 38
Discharge: HOME | End: 2023-09-21
Attending: ORTHOPAEDIC SURGERY | Admitting: ORTHOPAEDIC SURGERY
Payer: OTHER GOVERNMENT

## 2023-09-21 ENCOUNTER — ANESTHESIA (OUTPATIENT)
Dept: SURGERY | Facility: HOSPITAL | Age: 38
End: 2023-09-21
Payer: OTHER GOVERNMENT

## 2023-09-21 VITALS
HEART RATE: 88 BPM | OXYGEN SATURATION: 98 % | BODY MASS INDEX: 26.51 KG/M2 | SYSTOLIC BLOOD PRESSURE: 119 MMHG | TEMPERATURE: 98 F | RESPIRATION RATE: 16 BRPM | DIASTOLIC BLOOD PRESSURE: 72 MMHG | WEIGHT: 200 LBS | HEIGHT: 73 IN

## 2023-09-21 DIAGNOSIS — S46.011A TRAUMATIC COMPLETE TEAR OF RIGHT ROTATOR CUFF: Primary | ICD-10-CM

## 2023-09-21 PROCEDURE — D9220A PRA ANESTHESIA: ICD-10-PCS | Mod: CRNA,,, | Performed by: NURSE ANESTHETIST, CERTIFIED REGISTERED

## 2023-09-21 PROCEDURE — 36000710: Performed by: ORTHOPAEDIC SURGERY

## 2023-09-21 PROCEDURE — 63600175 PHARM REV CODE 636 W HCPCS: Performed by: ORTHOPAEDIC SURGERY

## 2023-09-21 PROCEDURE — 63600175 PHARM REV CODE 636 W HCPCS: Performed by: NURSE ANESTHETIST, CERTIFIED REGISTERED

## 2023-09-21 PROCEDURE — 25000003 PHARM REV CODE 250: Performed by: ORTHOPAEDIC SURGERY

## 2023-09-21 PROCEDURE — D9220A PRA ANESTHESIA: Mod: CRNA,,, | Performed by: NURSE ANESTHETIST, CERTIFIED REGISTERED

## 2023-09-21 PROCEDURE — 37000009 HC ANESTHESIA EA ADD 15 MINS: Performed by: ORTHOPAEDIC SURGERY

## 2023-09-21 PROCEDURE — 29826 PR SHLDR ARTHROSCOP,PART ACROMIOPLAS: ICD-10-PCS | Mod: RT,,, | Performed by: ORTHOPAEDIC SURGERY

## 2023-09-21 PROCEDURE — D9220A PRA ANESTHESIA: ICD-10-PCS | Mod: ANES,,, | Performed by: ANESTHESIOLOGY

## 2023-09-21 PROCEDURE — 29826 SHO ARTHRS SRG DECOMPRESSION: CPT | Mod: RT,,, | Performed by: ORTHOPAEDIC SURGERY

## 2023-09-21 PROCEDURE — 71000033 HC RECOVERY, INTIAL HOUR: Performed by: ORTHOPAEDIC SURGERY

## 2023-09-21 PROCEDURE — 29824 PR SHLDR ARTHROSCOP,SURG,DIS CLAVICULECTOMY: ICD-10-PCS | Mod: 51,RT,, | Performed by: ORTHOPAEDIC SURGERY

## 2023-09-21 PROCEDURE — 27201423 OPTIME MED/SURG SUP & DEVICES STERILE SUPPLY: Performed by: ORTHOPAEDIC SURGERY

## 2023-09-21 PROCEDURE — 29824 SHO ARTHRS SRG DSTL CLAVICLC: CPT | Mod: 51,RT,, | Performed by: ORTHOPAEDIC SURGERY

## 2023-09-21 PROCEDURE — D9220A PRA ANESTHESIA: Mod: ANES,,, | Performed by: ANESTHESIOLOGY

## 2023-09-21 PROCEDURE — 29827 SHO ARTHRS SRG RT8TR CUF RPR: CPT | Mod: RT,,, | Performed by: ORTHOPAEDIC SURGERY

## 2023-09-21 PROCEDURE — 37000008 HC ANESTHESIA 1ST 15 MINUTES: Performed by: ORTHOPAEDIC SURGERY

## 2023-09-21 PROCEDURE — 71000015 HC POSTOP RECOV 1ST HR: Performed by: ORTHOPAEDIC SURGERY

## 2023-09-21 PROCEDURE — 36000711: Performed by: ORTHOPAEDIC SURGERY

## 2023-09-21 PROCEDURE — C1713 ANCHOR/SCREW BN/BN,TIS/BN: HCPCS | Performed by: ORTHOPAEDIC SURGERY

## 2023-09-21 PROCEDURE — 25000003 PHARM REV CODE 250: Performed by: NURSE ANESTHETIST, CERTIFIED REGISTERED

## 2023-09-21 PROCEDURE — 63600175 PHARM REV CODE 636 W HCPCS: Performed by: ANESTHESIOLOGY

## 2023-09-21 PROCEDURE — 29827 PR SHLDR ARTHROSCOP,SURG,W/ROTAT CUFF REPR: ICD-10-PCS | Mod: RT,,, | Performed by: ORTHOPAEDIC SURGERY

## 2023-09-21 DEVICE — ANCHOR BIOCOMP SWVLLOK: Type: IMPLANTABLE DEVICE | Site: SHOULDER | Status: FUNCTIONAL

## 2023-09-21 DEVICE — ANCHOR SWIVELOCK C TIGERTAPE: Type: IMPLANTABLE DEVICE | Site: SHOULDER | Status: FUNCTIONAL

## 2023-09-21 DEVICE — ANCHOR SWIVELOCK C BLU FIBERTP: Type: IMPLANTABLE DEVICE | Site: SHOULDER | Status: FUNCTIONAL

## 2023-09-21 RX ORDER — PROMETHAZINE HYDROCHLORIDE 25 MG/1
25 TABLET ORAL EVERY 6 HOURS PRN
Status: DISCONTINUED | OUTPATIENT
Start: 2023-09-21 | End: 2023-09-21 | Stop reason: HOSPADM

## 2023-09-21 RX ORDER — DIPHENHYDRAMINE HYDROCHLORIDE 50 MG/ML
25 INJECTION INTRAMUSCULAR; INTRAVENOUS EVERY 6 HOURS PRN
Status: DISCONTINUED | OUTPATIENT
Start: 2023-09-21 | End: 2023-09-21 | Stop reason: HOSPADM

## 2023-09-21 RX ORDER — PROPOFOL 10 MG/ML
VIAL (ML) INTRAVENOUS
Status: DISCONTINUED | OUTPATIENT
Start: 2023-09-21 | End: 2023-09-21

## 2023-09-21 RX ORDER — ROCURONIUM BROMIDE 10 MG/ML
INJECTION, SOLUTION INTRAVENOUS
Status: DISCONTINUED | OUTPATIENT
Start: 2023-09-21 | End: 2023-09-21

## 2023-09-21 RX ORDER — HYDROCODONE BITARTRATE AND ACETAMINOPHEN 5; 325 MG/1; MG/1
1 TABLET ORAL EVERY 4 HOURS PRN
Status: DISCONTINUED | OUTPATIENT
Start: 2023-09-21 | End: 2023-09-21 | Stop reason: HOSPADM

## 2023-09-21 RX ORDER — ONDANSETRON 2 MG/ML
INJECTION INTRAMUSCULAR; INTRAVENOUS
Status: DISCONTINUED | OUTPATIENT
Start: 2023-09-21 | End: 2023-09-21

## 2023-09-21 RX ORDER — MORPHINE SULFATE 10 MG/ML
4 INJECTION INTRAMUSCULAR; INTRAVENOUS; SUBCUTANEOUS EVERY 5 MIN PRN
Status: DISCONTINUED | OUTPATIENT
Start: 2023-09-21 | End: 2023-09-21 | Stop reason: HOSPADM

## 2023-09-21 RX ORDER — PHENYLEPHRINE HYDROCHLORIDE 10 MG/ML
INJECTION INTRAVENOUS
Status: DISCONTINUED | OUTPATIENT
Start: 2023-09-21 | End: 2023-09-21

## 2023-09-21 RX ORDER — LIDOCAINE HYDROCHLORIDE 40 MG/ML
SOLUTION TOPICAL
Status: DISCONTINUED | OUTPATIENT
Start: 2023-09-21 | End: 2023-09-21

## 2023-09-21 RX ORDER — ONDANSETRON 2 MG/ML
4 INJECTION INTRAMUSCULAR; INTRAVENOUS DAILY PRN
Status: DISCONTINUED | OUTPATIENT
Start: 2023-09-21 | End: 2023-09-21 | Stop reason: HOSPADM

## 2023-09-21 RX ORDER — LIDOCAINE HYDROCHLORIDE 20 MG/ML
INJECTION, SOLUTION EPIDURAL; INFILTRATION; INTRACAUDAL; PERINEURAL
Status: DISCONTINUED | OUTPATIENT
Start: 2023-09-21 | End: 2023-09-21

## 2023-09-21 RX ORDER — HYDROMORPHONE HYDROCHLORIDE 2 MG/ML
0.5 INJECTION, SOLUTION INTRAMUSCULAR; INTRAVENOUS; SUBCUTANEOUS EVERY 5 MIN PRN
Status: DISCONTINUED | OUTPATIENT
Start: 2023-09-21 | End: 2023-09-21 | Stop reason: HOSPADM

## 2023-09-21 RX ORDER — SODIUM CHLORIDE 9 MG/ML
INJECTION, SOLUTION INTRAVENOUS CONTINUOUS
Status: DISCONTINUED | OUTPATIENT
Start: 2023-09-21 | End: 2023-09-21 | Stop reason: HOSPADM

## 2023-09-21 RX ORDER — ONDANSETRON 4 MG/1
8 TABLET, ORALLY DISINTEGRATING ORAL EVERY 8 HOURS PRN
Status: DISCONTINUED | OUTPATIENT
Start: 2023-09-21 | End: 2023-09-21 | Stop reason: HOSPADM

## 2023-09-21 RX ORDER — DEXAMETHASONE SODIUM PHOSPHATE 4 MG/ML
INJECTION, SOLUTION INTRA-ARTICULAR; INTRALESIONAL; INTRAMUSCULAR; INTRAVENOUS; SOFT TISSUE
Status: DISCONTINUED | OUTPATIENT
Start: 2023-09-21 | End: 2023-09-21

## 2023-09-21 RX ORDER — IPRATROPIUM BROMIDE AND ALBUTEROL SULFATE 2.5; .5 MG/3ML; MG/3ML
3 SOLUTION RESPIRATORY (INHALATION) ONCE
Status: DISCONTINUED | OUTPATIENT
Start: 2023-09-21 | End: 2023-09-21 | Stop reason: HOSPADM

## 2023-09-21 RX ORDER — HYDROCODONE BITARTRATE AND ACETAMINOPHEN 10; 325 MG/1; MG/1
1 TABLET ORAL EVERY 4 HOURS PRN
Status: DISCONTINUED | OUTPATIENT
Start: 2023-09-21 | End: 2023-09-21 | Stop reason: HOSPADM

## 2023-09-21 RX ORDER — MIDAZOLAM HYDROCHLORIDE 1 MG/ML
INJECTION INTRAMUSCULAR; INTRAVENOUS
Status: DISCONTINUED | OUTPATIENT
Start: 2023-09-21 | End: 2023-09-21

## 2023-09-21 RX ORDER — CEFAZOLIN SODIUM 1 G/3ML
INJECTION, POWDER, FOR SOLUTION INTRAMUSCULAR; INTRAVENOUS
Status: DISCONTINUED | OUTPATIENT
Start: 2023-09-21 | End: 2023-09-21

## 2023-09-21 RX ORDER — HYDROCODONE BITARTRATE AND ACETAMINOPHEN 5; 325 MG/1; MG/1
1 TABLET ORAL EVERY 6 HOURS PRN
Qty: 28 TABLET | Refills: 0 | Status: SHIPPED | OUTPATIENT
Start: 2023-09-21 | End: 2023-09-28

## 2023-09-21 RX ORDER — EPINEPHRINE 1 MG/ML
INJECTION INTRAMUSCULAR; INTRAVENOUS; SUBCUTANEOUS
Status: DISCONTINUED | OUTPATIENT
Start: 2023-09-21 | End: 2023-09-21 | Stop reason: HOSPADM

## 2023-09-21 RX ORDER — ACETAMINOPHEN 500 MG
1000 TABLET ORAL EVERY 6 HOURS PRN
Status: DISCONTINUED | OUTPATIENT
Start: 2023-09-21 | End: 2023-09-21 | Stop reason: HOSPADM

## 2023-09-21 RX ORDER — FENTANYL CITRATE 50 UG/ML
INJECTION, SOLUTION INTRAMUSCULAR; INTRAVENOUS
Status: DISCONTINUED | OUTPATIENT
Start: 2023-09-21 | End: 2023-09-21

## 2023-09-21 RX ORDER — MEPERIDINE HYDROCHLORIDE 25 MG/ML
25 INJECTION INTRAMUSCULAR; INTRAVENOUS; SUBCUTANEOUS EVERY 10 MIN PRN
Status: DISCONTINUED | OUTPATIENT
Start: 2023-09-21 | End: 2023-09-21 | Stop reason: HOSPADM

## 2023-09-21 RX ADMIN — LIDOCAINE HYDROCHLORIDE 120 MG: 40 SOLUTION TOPICAL at 08:09

## 2023-09-21 RX ADMIN — PHENYLEPHRINE HYDROCHLORIDE 100 MCG: 10 INJECTION INTRAVENOUS at 09:09

## 2023-09-21 RX ADMIN — ONDANSETRON 4 MG: 2 INJECTION INTRAMUSCULAR; INTRAVENOUS at 08:09

## 2023-09-21 RX ADMIN — DEXAMETHASONE SODIUM PHOSPHATE 8 MG: 4 INJECTION, SOLUTION INTRA-ARTICULAR; INTRALESIONAL; INTRAMUSCULAR; INTRAVENOUS; SOFT TISSUE at 08:09

## 2023-09-21 RX ADMIN — MIDAZOLAM 2 MG: 1 INJECTION INTRAMUSCULAR; INTRAVENOUS at 08:09

## 2023-09-21 RX ADMIN — PHENYLEPHRINE HYDROCHLORIDE 200 MCG: 10 INJECTION INTRAVENOUS at 09:09

## 2023-09-21 RX ADMIN — ROPIVACAINE HYDROCHLORIDE 30 ML: 7.5 INJECTION, SOLUTION EPIDURAL; PERINEURAL at 08:09

## 2023-09-21 RX ADMIN — SODIUM CHLORIDE: 9 INJECTION, SOLUTION INTRAVENOUS at 12:09

## 2023-09-21 RX ADMIN — LIDOCAINE HYDROCHLORIDE 80 MG: 20 INJECTION, SOLUTION INTRAVENOUS at 08:09

## 2023-09-21 RX ADMIN — HYDROMORPHONE HYDROCHLORIDE 0.5 MG: 2 INJECTION, SOLUTION INTRAMUSCULAR; INTRAVENOUS; SUBCUTANEOUS at 11:09

## 2023-09-21 RX ADMIN — PROPOFOL 200 MG: 10 INJECTION, EMULSION INTRAVENOUS at 08:09

## 2023-09-21 RX ADMIN — SUGAMMADEX 200 MG: 100 INJECTION, SOLUTION INTRAVENOUS at 11:09

## 2023-09-21 RX ADMIN — CEFAZOLIN 2 G: 1 INJECTION, POWDER, FOR SOLUTION INTRAMUSCULAR; INTRAVENOUS; PARENTERAL at 08:09

## 2023-09-21 RX ADMIN — ROCURONIUM BROMIDE 40 MG: 10 INJECTION, SOLUTION INTRAVENOUS at 08:09

## 2023-09-21 RX ADMIN — FENTANYL CITRATE 100 MCG: 50 INJECTION INTRAMUSCULAR; INTRAVENOUS at 08:09

## 2023-09-21 NOTE — BRIEF OP NOTE
[facility] - Orthopedic Periop Services  Brief Operative Note    Surgery Date: 9/21/2023     Surgeon(s) and Role:     * Eb Mascorro MD - Primary    Assisting Surgeon: None    Pre-op Diagnosis:  Traumatic complete tear of right rotator cuff, initial encounter [S46.011A]    Post-op Diagnosis:  Post-Op Diagnosis Codes:     * Traumatic complete tear of right rotator cuff, initial encounter [S46.011A]    Procedure(s) (LRB):  ARTHROSCOPY, SHOULDER (Right)  ARTHROSCOPY, SHOULDER, WITH SUBACROMIAL SPACE DECOMPRESSION (Right)  ACROMIOPLASTY, ARTHROSCOPIC (Right)  ARTHROSCOPY, SHOULDER, WITH DISTAL CLAVICLE EXCISION (Right)  DEBRIDEMENT, SHOULDER, ARTHROSCOPIC (Right)  REPAIR, ROTATOR CUFF, ARTHROSCOPIC (Right)    Anesthesia: Choice    Description of the findings of the procedure(s): See Op Note     Estimated Blood Loss: * No values recorded between 9/21/2023  9:49 AM and 9/21/2023 12:02 PM *         Specimens:   Specimen (24h ago, onward)      None              Discharge Note    OUTCOME: Patient tolerated treatment/procedure well without complication and is now ready for discharge.    DISPOSITION: Home or Self Care    FINAL DIAGNOSIS:  Traumatic complete tear of right rotator cuff    FOLLOWUP: In clinic    DISCHARGE INSTRUCTIONS:    Discharge Procedure Orders   Diet general     Keep surgical extremity elevated     Ice to affected area   Order Comments: using barrier between ice and skin (specify duration&frequency)     Remove dressing in 72 hours   Order Comments: Keep dressing in place for 72 hours     Change dressing (specify)   Order Comments: Dressing change: one time per day beginning 72 hours post op.     Call MD for:  temperature >100.4     Call MD for:  persistent nausea and vomiting     Call MD for:  severe uncontrolled pain     Call MD for:  difficulty breathing, headache or visual disturbances     Call MD for:  redness, tenderness, or signs of infection (pain, swelling, redness, odor or green/yellow  discharge around incision site)     Call MD for:  hives     Call MD for:  persistent dizziness or light-headedness     Call MD for:  extreme fatigue     Activity as tolerated     Shower on day dressing removed (No bath)     Weight bearing as tolerated        Clinical Reference Documents Added to Patient Instructions         Document    ROTATOR CUFF REPAIR DISCHARGE INSTRUCTIONS (ENGLISH)    SHOULDER ARTHROSCOPY DISCHARGE INSTRUCTIONS (ENGLISH)

## 2023-09-21 NOTE — BRIEF OP NOTE
[facility] - Orthopedic Periop Services  Brief Operative Note    Surgery Date: 9/21/2023     Surgeon(s) and Role:     * Eb Mascorro MD - Primary    Assisting Surgeon: None    Pre-op Diagnosis:  Traumatic complete tear of right rotator cuff, initial encounter [S46.011A]    Post-op Diagnosis:  Post-Op Diagnosis Codes:     * Traumatic complete tear of right rotator cuff, initial encounter [S46.011A]    Procedure(s) (LRB):  ARTHROSCOPY, SHOULDER (Right)  ARTHROSCOPY, SHOULDER, WITH SUBACROMIAL SPACE DECOMPRESSION (Right)  ACROMIOPLASTY, ARTHROSCOPIC (Right)  ARTHROSCOPY, SHOULDER, WITH DISTAL CLAVICLE EXCISION (Right)  DEBRIDEMENT, SHOULDER, ARTHROSCOPIC (Right)  REPAIR, ROTATOR CUFF, ARTHROSCOPIC (Right)    Anesthesia:  General/regional block    Description of the findings of the procedure(s): See Op Note     Estimated Blood Loss: * No values recorded between 9/21/2023  9:49 AM and 9/21/2023 11:53 AM *5cc         Specimens:   Specimen (24h ago, onward)      None              Discharge Note    OUTCOME: Patient tolerated treatment/procedure well without complication and is now ready for discharge.    DISPOSITION: Home or Self Care    FINAL DIAGNOSIS:  Traumatic complete tear of right rotator cuff    FOLLOWUP: In clinic    DISCHARGE INSTRUCTIONS:    Discharge Procedure Orders   Diet general     Keep surgical extremity elevated     Ice to affected area   Order Comments: using barrier between ice and skin (specify duration&frequency)     Remove dressing in 72 hours   Order Comments: Keep dressing in place for 72 hours     Change dressing (specify)   Order Comments: Dressing change: one time per day beginning 72 hours post op.     Call MD for:  temperature >100.4     Call MD for:  persistent nausea and vomiting     Call MD for:  severe uncontrolled pain     Call MD for:  difficulty breathing, headache or visual disturbances     Call MD for:  redness, tenderness, or signs of infection (pain, swelling, redness,  odor or green/yellow discharge around incision site)     Call MD for:  hives     Call MD for:  persistent dizziness or light-headedness     Call MD for:  extreme fatigue     Activity as tolerated     Shower on day dressing removed (No bath)     Weight bearing as tolerated

## 2023-09-21 NOTE — PROGRESS NOTES
1245 RELEASED TO [facility] RN AWAKE, ALERT. V/S 119/[id number]%. FAMILY AT BEDSIDE.

## 2023-09-21 NOTE — ANESTHESIA PREPROCEDURE EVALUATION
09/21/2023  Garrett Wright is a 38 y.o., male.      Pre-op Assessment    I have reviewed the Patient Summary Reports.     I have reviewed the Nursing Notes. I have reviewed the NPO Status.   I have reviewed the Medications.     Review of Systems  Anesthesia Hx:  Denies Family Hx of Anesthesia complications.   Denies Personal Hx of Anesthesia complications.   Social:  No Alcohol Use, Recreational Drugs, Smoker  Illicit Drug Use: Types of drugs include Marijuana,   Hematology/Oncology:  Hematology Normal   Oncology Normal     EENT/Dental:EENT/Dental Normal   Cardiovascular:  Cardiovascular Normal     Pulmonary:  Pulmonary Normal    Renal/:  Renal/ Normal     Hepatic/GI:   GERD    Musculoskeletal:  Musculoskeletal Normal    Neurological:  Neurology Normal    Endocrine:  Endocrine Normal    Dermatological:  Skin Normal    Psych:   Psychiatric History  Psychotic Disorder and Post-traumatic Stress Disorder.          Physical Exam  General: Well nourished, Cooperative, Alert and Oriented    Airway:  Mallampati: II / II  Mouth Opening: Normal  TM Distance: Normal  Neck ROM: Normal ROM    Dental:  Intact    Chest/Lungs:  Clear to auscultation    Heart:  Rate: Normal  Rhythm: Regular Rhythm  Sounds: Normal        Chemistry        Component Value Date/Time     02/03/2023 1300    K 4.1 02/03/2023 1300     02/03/2023 1300    CO2 27 02/03/2023 1300    BUN 20 (H) 02/03/2023 1300    CREATININE 1.13 02/03/2023 1300     (H) 02/03/2023 1300        Component Value Date/Time    CALCIUM 8.9 02/03/2023 1300    ALKPHOS 77 02/03/2023 1300    AST 36 02/03/2023 1300    ALT 33 02/03/2023 1300    BILITOT 0.7 02/03/2023 1300        Lab Results   Component Value Date    WBC 9.00 02/03/2023    HGB 14.0 02/03/2023    HCT 40.9 02/03/2023     02/03/2023     No results found for this or any previous  visit.      Anesthesia Plan  Type of Anesthesia, risks & benefits discussed:    Anesthesia Type: Gen ETT, Regional  Intra-op Monitoring Plan: Standard ASA Monitors  Post Op Pain Control Plan: multimodal analgesia and peripheral nerve block  Induction:  IV  Airway Plan: Direct  Informed Consent: Informed consent signed with the Patient and all parties understand the risks and agree with anesthesia plan.  All questions answered.   ASA Score: 2  Day of Surgery Review of History & Physical: H&P Update referred to the surgeon/provider.I have interviewed and examined the patient. I have reviewed the patient's H&P dated: There are no significant changes.     Ready For Surgery From Anesthesia Perspective.     .

## 2023-09-21 NOTE — DISCHARGE SUMMARY
[facility] - Orthopedic Periop Services  Discharge Note  Short Stay    Procedure(s) (LRB):  ARTHROSCOPY, SHOULDER (Right)  ARTHROSCOPY, SHOULDER, WITH SUBACROMIAL SPACE DECOMPRESSION (Right)  ACROMIOPLASTY, ARTHROSCOPIC (Right)  ARTHROSCOPY, SHOULDER, WITH DISTAL CLAVICLE EXCISION (Right)  DEBRIDEMENT, SHOULDER, ARTHROSCOPIC (Right)  REPAIR, ROTATOR CUFF, ARTHROSCOPIC (Right)      OUTCOME: Patient tolerated treatment/procedure well without complication and is now ready for discharge.    DISPOSITION: Home or Self Care    FINAL DIAGNOSIS:  Traumatic complete tear of right rotator cuff    FOLLOWUP: In clinic    DISCHARGE INSTRUCTIONS:    Discharge Procedure Orders   Diet general     Keep surgical extremity elevated     Ice to affected area   Order Comments: using barrier between ice and skin (specify duration&frequency)     Remove dressing in 72 hours   Order Comments: Keep dressing in place for 72 hours     Change dressing (specify)   Order Comments: Dressing change: one time per day beginning 72 hours post op.     Call MD for:  temperature >100.4     Call MD for:  persistent nausea and vomiting     Call MD for:  severe uncontrolled pain     Call MD for:  difficulty breathing, headache or visual disturbances     Call MD for:  redness, tenderness, or signs of infection (pain, swelling, redness, odor or green/yellow discharge around incision site)     Call MD for:  hives     Call MD for:  persistent dizziness or light-headedness     Call MD for:  extreme fatigue     Activity as tolerated     Shower on day dressing removed (No bath)     Weight bearing as tolerated         Clinical Reference Documents Added to Patient Instructions         Document    ROTATOR CUFF REPAIR DISCHARGE INSTRUCTIONS (ENGLISH)    SHOULDER ARTHROSCOPY DISCHARGE INSTRUCTIONS (ENGLISH)            TIME SPENT ON DISCHARGE: 15 minutes

## 2023-09-21 NOTE — PROGRESS NOTES
1203 RECEIVED TO RR WITH ORAL AIRWAY IN PLACE. HOB ELEVATED. COLOR PINK. RESP. DEEP UNLABORED. DRESSING RIGHT SHOULDER D/I. ARM SLING RIGHT ARM. RIGHT RADIAL PULSE POSITIVE, HAND WARM, NAILBEDS PINK. IV  INFUSING WELL LEFT HAND 22G. CATH. SCD HOSE IN PROGRESS. OBSERVING CLOSELY SEE FLOW SHEET.    1220 X-RAYS DONE PER DANYELL AMBROSE. WELL.    1225 ORAL AIRWAY REMOVED, ORIENTATION GIVEN. FOLLOWS COMMANDS. NO MOVEMENT FINGERS RIGHT HAND SECONDARY TO NERVE BLOCK. NO C/O PAIN.    1240 AWAKE, ALERT. NO C/O PAIN. NO DISTRESS NOTED. TRANSFERRED TO ROOM.

## 2023-09-21 NOTE — OP NOTE
[facility] - Orthopedic Periop Services  Surgery Department  Operative Note    SUMMARY     Date of Procedure: 9/21/2023     Procedure: Procedure(s) (LRB):  ARTHROSCOPY, SHOULDER (Right)  ARTHROSCOPY, SHOULDER, WITH SUBACROMIAL SPACE DECOMPRESSION (Right)  ACROMIOPLASTY, ARTHROSCOPIC (Right)  ARTHROSCOPY, SHOULDER, WITH DISTAL CLAVICLE EXCISION (Right)  DEBRIDEMENT, SHOULDER, ARTHROSCOPIC (Right)  REPAIR, ROTATOR CUFF, ARTHROSCOPIC (Right)     Surgeon(s) and Role:     * Eb Mascorro MD - Primary    Assisting Surgeon: None    Pre-Operative Diagnosis: Traumatic complete tear of right rotator cuff, initial encounter [S46.011A]    Post-Operative Diagnosis: Post-Op Diagnosis Codes:     * Traumatic complete tear of right rotator cuff, initial encounter [S46.011A]    Anesthesia:  General/regional block    Technical Procedures Used:   Right shoulder arthroscopy                        DEPARTMENT OF ORTHOPEDIC SURGERY                OPERATIVE REPORT     NAME:  Garrett Wright  MRN: [id number]     DATE OF SURGERY:  9/21/2023    PREOPERATIVE DIAGNOSIS: right shoulder internal derangement       POSTOPERATIVE DIAGNOSIS:  Degenerative labral pathology, AC joint DJD with impingement, full-thickness retracted complex rotator cuff tear-right shoulder    ANESTHESIA:  General/ Regional block    PROCEDURE:  Examination under anesthesia, arthroscopy with extensive debridement, bursectomy, coracoacromial ligament resection, Evangelista distal clavicle resection (6-8 mm), acromioplasty - right shoulder      PROCEDURE IN DETAIL:  The patient was taken to the operating room and placed in the supine position.  After adequate level of general anesthesia had been achieved (see anesthesia note) patients right shoulder was examined.  right shoulder normal contour.  There is full passive range of motion of the shoulder without instability signs.  right shoulder and upper extremity was scrubbed with Betadine and draped in sterile fashion.   The operation was begun by inflating the joint with sterile saline through a posterior approach using an 18 gauge spinal needle.  Now a linear skin incision was made over the anterior aspect of the right shoulder for introduction of the blunt arthroscopy cannula.  Intraarticular positioning was confirmed with the arthroscopy camera. The anterior portal was established though an anterior incision made lateral to the coracoid process. The joint was entered through a trans-subscapularis muscle approach.  Inspection of glenohumeral joint showed normal cartilaginous surfaces.  The long head biceps tendon was intact.  There was some degenerative tearing of the anterior labrum.  This debrided with a shaver and contoured with the radiofrequency Wand.  There was a large retracted complex full-thickness tear of the supraspinatus tendon.  The arthroscope was withdrawn the blunt cannula was redirected from the posterior portal superiorly in the subacromial space.  Anterior and lateral portals were established.  A bursectomy was performed with the shaver.  The coracoacromial ligaments taken down with the radiofrequency Wand.  A Evangelista distal clavicle resection and acromioplasty performed with a barrel bur.  Good subacromial decompression was confirmed with multiple portal viewing.  Attention was turned to the rotator cuff tear.  Insertion site at the greater tuberosity footprint was abraded to punctate bleeding cortical bone with a shaver.  The rotator cuff tear was repaired with Arthrex SpeedBridge as follows:  Two Arthrex anchors were placed in the bone within the footprint.  The double limb sutures were brought out through the anterior portal and .  2 punch holes were made in the greater tuberosity.  2 mm FiberTape sutures were Chela crossed and brought out through the anterior portal.  The cuff tear was approximated by tensioning the sutures and in the greater tuberosity.  Good approximation stability the cuff  tears were confirmed.  The excess sutures removed. Now, the subacromial space was irrigated with saline solution and arthroscope withdrawn.  The stab wounds were reapproximated with interrupted 4-0 suture.  The wounds dressed sterilely and arm sling applied.  The patient was taken to the recovery room in satisfactory condition.  ESTIMATED BLOOD LOSS:  5ccs.  The patient received Ancef antibiotic intravenously prior to the procedure.      Eb Mascorro MD       Significant Surgical Tasks Conducted by the Assistant(s), if Applicable:     Complications: No    Estimated Blood Loss (EBL): * No values recorded between 9/21/2023  9:49 AM and 9/21/2023 11:53 AM *5cc           Implants:   Implant Name Type Inv. Item Serial No.  Lot No. LRB No. Used Action   ANCHOR SWIVELOCK C MARCO FIBERTP - [id number]  ANCHOR SWIVELOCK C MARCO FIBERTP  ARTHREX  Right 1 Implanted   ANCHOR SWIVELOCK C TIGERTAPE - [id number]  ANCHOR SWIVELOCK C TIGERTAPE  ARTHREX  Right 1 Implanted   ANCHOR BIOCOMP SWVLLOK - [id number]  ANCHOR BIOCOMP SWVLLOK  ARTHREX  Right 1 Implanted   ANCHOR SWIVELOCK C MARCO FIBERTP - [id number]  ANCHOR SWIVELOCK C MARCO FIBERTP  ARTHREX  Right 1 Implanted   ANCHOR BIOCOMP SWVLLOK - [id number]  ANCHOR BIOCOMP SWVLLOK  ARTHREX  Right 1 Implanted       Specimens:   Specimen (24h ago, onward)      None                    Condition: Good    Disposition: PACU - hemodynamically stable.    Attestation: I was present and scrubbed for the entire procedure.

## 2023-09-21 NOTE — TRANSFER OF CARE
"Anesthesia Transfer of Care Note    Patient: Garrett Wright    Procedure(s) Performed: Procedure(s) (LRB):  ARTHROSCOPY, SHOULDER (Right)  ARTHROSCOPY, SHOULDER, WITH SUBACROMIAL SPACE DECOMPRESSION (Right)  ACROMIOPLASTY, ARTHROSCOPIC (Right)  ARTHROSCOPY, SHOULDER, WITH DISTAL CLAVICLE EXCISION (Right)  DEBRIDEMENT, SHOULDER, ARTHROSCOPIC (Right)  REPAIR, ROTATOR CUFF, ARTHROSCOPIC (Right)    Patient location: PACU    Anesthesia Type: general    Transport from OR: Transported from OR on room air with adequate spontaneous ventilation    Post pain: adequate analgesia    Post assessment: no apparent anesthetic complications and tolerated procedure well    Post vital signs: stable    Level of consciousness: responds to stimulation    Nausea/Vomiting: no nausea/vomiting    Complications: none    Transfer of care protocol was followedComments: Report Given to PACU rn VSS      Last vitals:   Visit Vitals  /69 (BP Location: Right arm, Patient Position: Lying)   Pulse 85   Temp 36.4 °C (97.6 °F) (Oral)   Resp 13   Ht 6' 1" (1.854 m)   Wt 90.7 kg (200 lb)   SpO2 (!) 94%   BMI 26.39 kg/m²     "

## 2023-09-21 NOTE — ANESTHESIA PROCEDURE NOTES
Intubation    Date/Time: 9/21/2023 9:06 AM    Performed by: Geoff Levi CRNA  Authorized by: Geoff Levi CRNA    Intubation:     Induction:  Intravenous    Intubated:  Postinduction    Mask Ventilation:  Easy mask    Attempts:  1    Attempted By:  CRNA    Method of Intubation:  Direct    Blade:  Anatsacia 4    Laryngeal View Grade: Grade IIA - cords partially seen      Difficult Airway Encountered?: No      Complications:  None    Airway Device:  Oral endotracheal tube    Airway Device Size:  7.5    Style/Cuff Inflation:  Cuffed    Inflation Amount (mL):  8    Tube secured:  22    Secured at:  The lips    Placement Verified By:  Capnometry    Complicating Factors:  None    Findings Post-Intubation:  BS equal bilateral and atraumatic/condition of teeth unchanged

## 2023-10-02 PROCEDURE — 64415 NJX AA&/STRD BRCH PLXS IMG: CPT | Mod: 59,RT,, | Performed by: ANESTHESIOLOGY

## 2023-10-02 PROCEDURE — 64415 PERIPHERAL BLOCK: ICD-10-PCS | Mod: 59,RT,, | Performed by: ANESTHESIOLOGY

## 2023-10-02 RX ORDER — ROPIVACAINE HYDROCHLORIDE 7.5 MG/ML
INJECTION, SOLUTION EPIDURAL; PERINEURAL
Status: DISCONTINUED | OUTPATIENT
Start: 2023-09-21 | End: 2023-10-02

## 2023-10-02 NOTE — ANESTHESIA POSTPROCEDURE EVALUATION
Anesthesia Post Evaluation    Patient: Garrett Wright    Procedure(s) Performed: Procedure(s) (LRB):  ARTHROSCOPY, SHOULDER (Right)  ARTHROSCOPY, SHOULDER, WITH SUBACROMIAL SPACE DECOMPRESSION (Right)  ACROMIOPLASTY, ARTHROSCOPIC (Right)  ARTHROSCOPY, SHOULDER, WITH DISTAL CLAVICLE EXCISION (Right)  DEBRIDEMENT, SHOULDER, ARTHROSCOPIC (Right)  REPAIR, ROTATOR CUFF, ARTHROSCOPIC (Right)    Final Anesthesia Type: general      Patient location during evaluation: PACU  Patient participation: Yes- Able to Participate  Level of consciousness: awake and alert  Post-procedure vital signs: reviewed and stable  Pain management: adequate  Airway patency: patent  DONNA mitigation strategies: Multimodal analgesia and Use of major conduction anesthesia (spinal/epidural) or peripheral nerve block  PONV status at discharge: No PONV  Anesthetic complications: no      Cardiovascular status: blood pressure returned to baseline  Respiratory status: unassisted  Hydration status: euvolemic  Follow-up not needed.          Vitals Value Taken Time   /72 09/21/23 1315   Temp 36.4 °C (97.6 °F) 09/21/23 1231   Pulse 88 09/21/23 1300   Resp 16 09/21/23 1245   SpO2 98 % 09/21/23 1300         Event Time   Out of Recovery 12:40:00         Pain/Suraj Score: No data recorded

## 2023-10-02 NOTE — ANESTHESIA PROCEDURE NOTES
Peripheral Block    Patient location during procedure: OR   Block not for primary anesthetic.  Reason for block: at surgeon's request and post-op pain management   Post-op Pain Location: right shoulder   Start time: 9/21/2023 8:55 AM  Timeout: 9/21/2023 8:55 AM   End time: 9/21/2023 8:57 AM    Staffing  Authorizing Provider: Quoc Balbuena MD  Performing Provider: Quoc Balbuena MD    Staffing  Performed by: Quoc Balbuena MD  Authorized by: Quoc Balbuena MD    Preanesthetic Checklist  Completed: patient identified, IV checked, site marked, risks and benefits discussed, surgical consent, monitors and equipment checked, pre-op evaluation and timeout performed  Peripheral Block  Patient position: sitting  Prep: ChloraPrep  Patient monitoring: heart rate, continuous pulse ox, continuous capnometry, frequent blood pressure checks and cardiac monitor  Block type: interscalene  Laterality: right  Injection technique: single shot  Needle  Needle type: Stimuplex   Needle gauge: 20 G  Needle length: 2 in  Needle localization: nerve stimulator and ultrasound guidance   -ultrasound image captured on disc.  Assessment  Injection assessment: negative aspiration, negative parasthesia and local visualized surrounding nerve  Paresthesia pain: none  Heart rate change: no  Slow fractionated injection: yes  Pain Tolerance: comfortable throughout block  Medications:    Medications: ROPIvacaine (NAROPIN) injection 0.75% - Perineural   30 mL - 9/21/2023 8:57:00 AM

## 2023-10-17 ENCOUNTER — OFFICE VISIT (OUTPATIENT)
Dept: FAMILY MEDICINE | Facility: CLINIC | Age: 38
End: 2023-10-17
Payer: OTHER GOVERNMENT

## 2023-10-17 VITALS
HEART RATE: 90 BPM | TEMPERATURE: 98 F | HEIGHT: 73 IN | WEIGHT: 178 LBS | OXYGEN SATURATION: 99 % | RESPIRATION RATE: 18 BRPM | DIASTOLIC BLOOD PRESSURE: 86 MMHG | SYSTOLIC BLOOD PRESSURE: 138 MMHG | BODY MASS INDEX: 23.59 KG/M2

## 2023-10-17 DIAGNOSIS — Z00.00 HEALTH CARE MAINTENANCE: ICD-10-CM

## 2023-10-17 DIAGNOSIS — G62.9 NEUROPATHY: ICD-10-CM

## 2023-10-17 DIAGNOSIS — Z13.220 ENCOUNTER FOR SCREENING FOR LIPID DISORDER: ICD-10-CM

## 2023-10-17 DIAGNOSIS — K21.9 GASTROESOPHAGEAL REFLUX DISEASE, UNSPECIFIED WHETHER ESOPHAGITIS PRESENT: ICD-10-CM

## 2023-10-17 DIAGNOSIS — B34.9 VIRAL DISEASE: ICD-10-CM

## 2023-10-17 DIAGNOSIS — Z76.0 ENCOUNTER FOR MEDICATION REFILL: Primary | ICD-10-CM

## 2023-10-17 PROCEDURE — 99214 PR OFFICE/OUTPT VISIT, EST, LEVL IV, 30-39 MIN: ICD-10-PCS | Mod: ,,, | Performed by: NURSE PRACTITIONER

## 2023-10-17 PROCEDURE — 99214 OFFICE O/P EST MOD 30 MIN: CPT | Mod: ,,, | Performed by: NURSE PRACTITIONER

## 2023-10-17 RX ORDER — RISPERIDONE 1 MG/1
TABLET ORAL
COMMUNITY
Start: 2023-09-14

## 2023-10-17 RX ORDER — TERBINAFINE HYDROCHLORIDE 250 MG/1
TABLET ORAL
COMMUNITY
Start: 2023-08-23

## 2023-10-17 RX ORDER — CEPHALEXIN 500 MG/1
500 CAPSULE ORAL 3 TIMES DAILY
COMMUNITY
Start: 2023-10-06

## 2023-10-17 RX ORDER — OMEPRAZOLE 20 MG/1
20 CAPSULE, DELAYED RELEASE ORAL DAILY
Qty: 90 CAPSULE | Refills: 1 | Status: SHIPPED | OUTPATIENT
Start: 2023-10-17

## 2023-10-17 RX ORDER — VALACYCLOVIR HYDROCHLORIDE 500 MG/1
500 TABLET, FILM COATED ORAL DAILY
Qty: 90 TABLET | Refills: 1 | Status: SHIPPED | OUTPATIENT
Start: 2023-10-17 | End: 2024-10-17

## 2023-10-17 RX ORDER — TERBINAFINE HYDROCHLORIDE 250 MG/1
250 TABLET ORAL DAILY
Qty: 90 TABLET | Refills: 1 | Status: CANCELLED | OUTPATIENT
Start: 2023-10-17

## 2023-10-17 RX ORDER — CARBAMAZEPINE 200 MG/1
1 TABLET ORAL 2 TIMES DAILY
COMMUNITY
Start: 2022-12-29 | End: 2023-12-29

## 2023-10-17 RX ORDER — GABAPENTIN 600 MG/1
600 TABLET ORAL NIGHTLY
Qty: 90 TABLET | Refills: 1 | Status: SHIPPED | OUTPATIENT
Start: 2023-10-17 | End: 2024-10-16

## 2023-10-31 PROBLEM — Z76.0 ENCOUNTER FOR MEDICATION REFILL: Status: ACTIVE | Noted: 2023-10-31

## 2023-10-31 PROBLEM — G62.9 NEUROPATHY: Status: ACTIVE | Noted: 2023-10-31

## 2023-10-31 NOTE — PROGRESS NOTES
JUAN Sifuentes   [facility]  [id number] [place] [id number]  [phone]      PATIENT NAME: Garrett Wright  : 1985  DATE: 10/17/23  MRN: [id number]      Billing Provider: JUAN Sifeuntes  Level of Service:   Patient PCP Information       Provider PCP Type    JUAN Sifuentes General            Reason for Visit / Chief Complaint: Medication Refill (Pt is not fasting. ) and Follow-up       Update PCP  Update Chief Complaint         History of Present Illness / Problem Focused Workflow       38 year old male presents for medication refills  He is not fasting for labs   Denies any problems with current meds    Hx of PTSD, daily smoker      Review of Systems     Review of Systems   Constitutional:  Negative for fatigue and fever.   HENT:  Negative for congestion.    Eyes:  Negative for visual disturbance.   Respiratory:  Negative for cough and shortness of breath.    Cardiovascular:  Negative for chest pain.   Gastrointestinal:  Negative for abdominal pain, diarrhea and nausea.   Endocrine: Negative for cold intolerance and heat intolerance.   Musculoskeletal:  Negative for gait problem.   Neurological:  Negative for dizziness, weakness and headaches.   Psychiatric/Behavioral:  Positive for sleep disturbance. The patient is nervous/anxious.        Medical / Social / Family History     Past Medical History:   Diagnosis Date    Male erectile dysfunction, unspecified     PTSD (post-traumatic stress disorder)        Past Surgical History:   Procedure Laterality Date    ARTHROSCOPIC ACROMIOPLASTY OF SHOULDER Right 2023    Procedure: ACROMIOPLASTY, ARTHROSCOPIC;  Surgeon: Eb Mascorro MD;  Location: [facility];  Service: Orthopedics;  Laterality: Right;    ARTHROSCOPIC DEBRIDEMENT OF SHOULDER Right 2023    Procedure: DEBRIDEMENT, SHOULDER, ARTHROSCOPIC;  Surgeon: Eb Mascorro MD;  Location: [facility];  Service:  Orthopedics;  Laterality: Right;    ARTHROSCOPIC REPAIR OF ROTATOR CUFF OF SHOULDER Right 9/21/2023    Procedure: REPAIR, ROTATOR CUFF, ARTHROSCOPIC;  Surgeon: Eb Mascorro MD;  Location: [facility] OR;  Service: Orthopedics;  Laterality: Right;    ARTHROSCOPY OF SHOULDER WITH DECOMPRESSION OF SUBACROMIAL SPACE Right 9/21/2023    Procedure: ARTHROSCOPY, SHOULDER, WITH SUBACROMIAL SPACE DECOMPRESSION;  Surgeon: Eb Mascorro MD;  Location: [facility] OR;  Service: Orthopedics;  Laterality: Right;    ARTHROSCOPY OF SHOULDER WITH REMOVAL OF DISTAL CLAVICLE Right 9/21/2023    Procedure: ARTHROSCOPY, SHOULDER, WITH DISTAL CLAVICLE EXCISION;  Surgeon: Eb Mascorro MD;  Location: [facility] OR;  Service: Orthopedics;  Laterality: Right;    SHOULDER ARTHROSCOPY Right 9/21/2023    Procedure: ARTHROSCOPY, SHOULDER;  Surgeon: Eb Mascorro MD;  Location: [facility] OR;  Service: Orthopedics;  Laterality: Right;       Social History    reports that he has been smoking cigarettes. He has never used smokeless tobacco. He reports that he does not currently use alcohol. He reports current drug use. Drug: Marijuana.    Family History  's family history includes Cancer in his mother.    Medications and Allergies     Medications  Outpatient Medications Marked as Taking for the 10/17/23 encounter (Office Visit) with Jacklyn Wolf FNP   Medication Sig Dispense Refill    carBAMazepine (TEGRETOL) 200 mg tablet Take 1 tablet by mouth 2 (two) times daily.      cephALEXin (KEFLEX) 500 MG capsule Take 500 mg by mouth 3 (three) times daily.      methocarbamoL (ROBAXIN) 500 MG Tab Take 1 tablet (500 mg total) by mouth daily as needed (shoulder pain). 30 tablet 2    risperiDONE (RISPERDAL) 1 MG tablet Take by mouth.      sildenafiL (VIAGRA) 100 MG tablet Take 1 tablet (100 mg total) by mouth as needed for Erectile Dysfunction. 30 tablet 2    terbinafine HCL (LAMISIL) 250 mg tablet        [DISCONTINUED] gabapentin (NEURONTIN) 600 MG tablet Take 1 tablet (600 mg total) by mouth nightly. 90 tablet 1    [DISCONTINUED] omeprazole (PRILOSEC) 20 MG capsule Take 1 capsule (20 mg total) by mouth once daily. 90 capsule 1    [DISCONTINUED] valACYclovir (VALTREX) 500 MG tablet Take 1 tablet (500 mg total) by mouth once daily. 90 tablet 1       Allergies  Review of patient's allergies indicates:  No Known Allergies    Physical Examination     Vitals:    10/17/23 1350   BP: 138/86   Pulse: 90   Resp: 18   Temp: 98.1 °F (36.7 °C)     Physical Exam  HENT:      Head: Normocephalic.      Nose: Nose normal. No congestion.      Mouth/Throat:      Mouth: Mucous membranes are moist.   Neck:      Comments: Lipoma of left side of neck  Pulmonary:      Effort: Pulmonary effort is normal. No respiratory distress.   Abdominal:      General: Bowel sounds are normal.      Palpations: Abdomen is soft.   Musculoskeletal:         General: No tenderness. Normal range of motion.      Cervical back: Neck supple.      Comments: Pain to left hand; cyst near thumb   Skin:     General: Skin is warm.   Neurological:      Mental Status: He is alert and oriented to person, place, and time.           Imaging / Labs     No visits with results within 1 Day(s) from this visit.   Latest known visit with results is:   Admission on 02/03/2023, Discharged on 02/03/2023   Component Date Value Ref Range Status    Sodium 02/03/2023 136  136 - 145 mmol/L Final    Potassium 02/03/2023 4.1  3.5 - 5.1 mmol/L Final    Chloride 02/03/2023 100  98 - 107 mmol/L Final    CO2 02/03/2023 27  21 - 32 mmol/L Final    Anion Gap 02/03/2023 13  7 - 16 mmol/L Final    Glucose 02/03/2023 124 (H)  74 - 106 mg/dL Final    BUN 02/03/2023 20 (H)  7 - 18 mg/dL Final    Creatinine 02/03/2023 1.13  0.70 - 1.30 mg/dL Final    BUN/Creatinine Ratio 02/03/2023 18  6 - 20 Final    Calcium 02/03/2023 8.9  8.5 - 10.1 mg/dL Final    Total Protein 02/03/2023 7.0  6.4 - 8.2 g/dL Final     Albumin 02/03/2023 4.1  3.5 - 5.0 g/dL Final    Globulin 02/03/2023 2.9  2.0 - 4.0 g/dL Final    A/G Ratio 02/03/2023 1.4   Final    Bilirubin, Total 02/03/2023 0.7  >0.0 - 1.2 mg/dL Final    Alk Phos 02/03/2023 77  45 - 115 U/L Final    ALT 02/03/2023 33  16 - 61 U/L Final    AST 02/03/2023 36  15 - 37 U/L Final    eGFR 02/03/2023 85  >=60 mL/min/1.73m² Final    PT 02/03/2023 12.8  11.7 - 14.7 seconds Final    INR 02/03/2023 1.00  <=3.30 Final    PTT 02/03/2023 27.8  25.2 - 37.3 seconds Final    Lactic Acid 02/03/2023 1.0  0.4 - 2.0 mmol/L Final    Group & Rh 02/03/2023 AB NEG   Final    Indirect Vi 02/03/2023 NEG   Final    Ethanol 02/03/2023 Not Detected   Final    WBC 02/03/2023 9.00  4.50 - 11.00 K/uL Final    RBC 02/03/2023 4.62  4.60 - 6.20 M/uL Final    Hemoglobin 02/03/2023 14.0  13.5 - 18.0 g/dL Final    Hematocrit 02/03/2023 40.9  40.0 - 54.0 % Final    MCV 02/03/2023 88.5  80.0 - 96.0 fL Final    MCH 02/03/2023 30.3  27.0 - 31.0 pg Final    MCHC 02/03/2023 34.2  32.0 - 36.0 g/dL Final    RDW 02/03/2023 12.7  11.5 - 14.5 % Final    Platelet Count 02/03/2023 279  150 - 400 K/uL Final    MPV 02/03/2023 9.4  9.4 - 12.4 fL Final    Neutrophils % 02/03/2023 71.5 (H)  53.0 - 65.0 % Final    Lymphocytes % 02/03/2023 16.0 (L)  27.0 - 41.0 % Final    Monocytes % 02/03/2023 10.1 (H)  2.0 - 6.0 % Final    Eosinophils % 02/03/2023 1.7  1.0 - 4.0 % Final    Basophils % 02/03/2023 0.4  0.0 - 1.0 % Final    Immature Granulocytes % 02/03/2023 0.3  0.0 - 0.4 % Final    nRBC, Auto 02/03/2023 0.0  <=0.0 % Final    Neutrophils, Abs 02/03/2023 6.43  1.80 - 7.70 K/uL Final    Lymphocytes, Absolute 02/03/2023 1.44  1.00 - 4.80 K/uL Final    Monocytes, Absolute 02/03/2023 0.91 (H)  0.00 - 0.80 K/uL Final    Eosinophils, Absolute 02/03/2023 0.15  0.00 - 0.50 K/uL Final    Basophils, Absolute 02/03/2023 0.04  0.00 - 0.20 K/uL Final    Immature Granulocytes, Absolute 02/03/2023 0.03  0.00 - 0.04 K/uL Final    nRBC, Absolute  02/03/2023 0.00  <=0.00 x10e3/uL Final    Diff Type 02/03/2023 Auto   Final    ABORH Retype 02/03/2023 AB NEG   Final     X-Ray Shoulder 1 View Right  Narrative: EXAMINATION:  XR SHOULDER 1 VIEW RIGHT    CLINICAL HISTORY:  Right shoulder surgery;    COMPARISON:  11 September 2023    TECHNIQUE:  Single AP view of the shoulder    FINDINGS:  There has been interval subacromial decompression and rotator cuff repair arthroscopy procedure.  The joint alignment appears within normal limits postoperatively. No other abnormality is identified.  Impression: Expected postoperative appearance of the shoulder.    Electronically signed by: Braden Kaba  Date:    09/21/2023  Time:    12:27      Assessment and Plan (including Health Maintenance)      Problem List  Smart Sets  Document Outside HM   :    Health Maintenance Due   Topic Date Due    Hepatitis C Screening  Never done    Lipid Panel  Never done    COVID-19 Vaccine (1) Never done    HIV Screening  Never done    TETANUS VACCINE  Never done    Pneumococcal Vaccines (Age 0-64) (2 - PCV) 03/12/2005    Influenza Vaccine (1) 09/01/2023       Problem List Items Addressed This Visit          Neuro    Neuropathy    Current Assessment & Plan     Condition is stable   Continue current meds         Relevant Medications    gabapentin (NEURONTIN) 600 MG tablet       GI    Gastroesophageal reflux disease    Current Assessment & Plan     Discussed diet and avoiding spicy foods  Condition is stable  Continue current meds          Relevant Medications    omeprazole (PRILOSEC) 20 MG capsule       Other    Encounter for medication refill - Primary    Current Assessment & Plan     Presents for medication refills  He is not fasting for labs   Denies any problems with current meds  Advised him to return for labs when fasting           Other Visit Diagnoses       Viral disease        Relevant Medications    valACYclovir (VALTREX) 500 MG tablet    Encounter for screening for lipid disorder         Relevant Orders    Lipid Panel    Health care maintenance        Relevant Orders    Hepatitis C Antibody    HIV 1/2 Ag/Ab (4th Gen)    Comprehensive Metabolic Panel    CBC Auto Differential              Signature:  JUAN Sifuentes  [address] [place] [address]  [phone]    Date of encounter: 10/17/23

## 2023-10-31 NOTE — ASSESSMENT & PLAN NOTE
Presents for medication refills  He is not fasting for labs   Denies any problems with current meds  Advised him to return for labs when fasting

## 2024-04-09 ENCOUNTER — HOSPITAL ENCOUNTER (EMERGENCY)
Facility: HOSPITAL | Age: 39
Discharge: HOME | End: 2024-04-09
Payer: OTHER GOVERNMENT

## 2024-04-09 VITALS
DIASTOLIC BLOOD PRESSURE: 96 MMHG | HEART RATE: 111 BPM | OXYGEN SATURATION: 97 % | RESPIRATION RATE: 18 BRPM | WEIGHT: 199 LBS | BODY MASS INDEX: 25.54 KG/M2 | HEIGHT: 74 IN | SYSTOLIC BLOOD PRESSURE: 144 MMHG | TEMPERATURE: 99 F

## 2024-04-09 DIAGNOSIS — S62.317A DISPLACED FRACTURE OF BASE OF FIFTH METACARPAL BONE, LEFT HAND, INITIAL ENCOUNTER FOR CLOSED FRACTURE: Primary | ICD-10-CM

## 2024-04-09 PROCEDURE — 99283 EMERGENCY DEPT VISIT LOW MDM: CPT | Mod: 25

## 2024-04-09 PROCEDURE — 29125 APPL SHORT ARM SPLINT STATIC: CPT | Mod: LT

## 2024-04-09 PROCEDURE — 99283 EMERGENCY DEPT VISIT LOW MDM: CPT | Mod: ,,, | Performed by: NURSE PRACTITIONER

## 2024-04-09 NOTE — DISCHARGE INSTRUCTIONS
Wear splint as directed.  Follow-up with Orthopedics, you should be contacted with an appointment.  Return to the emergency department as needed.
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
## 2024-04-11 DIAGNOSIS — M79.642 HAND PAIN, LEFT: Primary | ICD-10-CM

## 2024-04-15 ENCOUNTER — OFFICE VISIT (OUTPATIENT)
Dept: ORTHOPEDICS | Facility: CLINIC | Age: 39
End: 2024-04-15
Payer: OTHER GOVERNMENT

## 2024-04-15 ENCOUNTER — HOSPITAL ENCOUNTER (OUTPATIENT)
Dept: RADIOLOGY | Facility: HOSPITAL | Age: 39
Discharge: HOME | End: 2024-04-15
Attending: ORTHOPAEDIC SURGERY
Payer: OTHER GOVERNMENT

## 2024-04-15 DIAGNOSIS — S62.339A CLOSED BOXER'S FRACTURE, INITIAL ENCOUNTER: Primary | ICD-10-CM

## 2024-04-15 DIAGNOSIS — S62.317A DISPLACED FRACTURE OF BASE OF FIFTH METACARPAL BONE, LEFT HAND, INITIAL ENCOUNTER FOR CLOSED FRACTURE: ICD-10-CM

## 2024-04-15 DIAGNOSIS — M79.642 HAND PAIN, LEFT: ICD-10-CM

## 2024-04-15 PROCEDURE — 26600 TREAT METACARPAL FRACTURE: CPT | Mod: PBBFAC | Performed by: ORTHOPAEDIC SURGERY

## 2024-04-15 PROCEDURE — 99213 OFFICE O/P EST LOW 20 MIN: CPT | Mod: PBBFAC,25 | Performed by: ORTHOPAEDIC SURGERY

## 2024-04-15 PROCEDURE — 73130 X-RAY EXAM OF HAND: CPT | Mod: 26,LT,, | Performed by: ORTHOPAEDIC SURGERY

## 2024-04-15 PROCEDURE — 73130 X-RAY EXAM OF HAND: CPT | Mod: TC,LT

## 2024-04-15 PROCEDURE — 26600 TREAT METACARPAL FRACTURE: CPT | Mod: S$PBB,F4,, | Performed by: ORTHOPAEDIC SURGERY

## 2024-04-15 PROCEDURE — 99204 OFFICE O/P NEW MOD 45 MIN: CPT | Mod: S$PBB,57,, | Performed by: ORTHOPAEDIC SURGERY
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
## 2024-04-17 ENCOUNTER — OFFICE VISIT (OUTPATIENT)
Dept: FAMILY MEDICINE | Facility: CLINIC | Age: 39
End: 2024-04-17
Payer: OTHER GOVERNMENT

## 2024-04-17 VITALS
WEIGHT: 190 LBS | TEMPERATURE: 98 F | SYSTOLIC BLOOD PRESSURE: 128 MMHG | DIASTOLIC BLOOD PRESSURE: 82 MMHG | BODY MASS INDEX: 24.38 KG/M2 | RESPIRATION RATE: 18 BRPM | HEART RATE: 87 BPM | OXYGEN SATURATION: 99 % | HEIGHT: 74 IN

## 2024-04-17 DIAGNOSIS — K21.9 GASTROESOPHAGEAL REFLUX DISEASE, UNSPECIFIED WHETHER ESOPHAGITIS PRESENT: ICD-10-CM

## 2024-04-17 DIAGNOSIS — Z11.4 SCREENING FOR HIV (HUMAN IMMUNODEFICIENCY VIRUS): ICD-10-CM

## 2024-04-17 DIAGNOSIS — N52.9 ERECTILE DYSFUNCTION, UNSPECIFIED ERECTILE DYSFUNCTION TYPE: ICD-10-CM

## 2024-04-17 DIAGNOSIS — F41.9 ANXIETY AND DEPRESSION: Primary | ICD-10-CM

## 2024-04-17 DIAGNOSIS — M62.838 MUSCLE SPASM: ICD-10-CM

## 2024-04-17 DIAGNOSIS — G62.9 NEUROPATHY: ICD-10-CM

## 2024-04-17 DIAGNOSIS — Z00.00 HEALTH CARE MAINTENANCE: ICD-10-CM

## 2024-04-17 DIAGNOSIS — F32.A ANXIETY AND DEPRESSION: Primary | ICD-10-CM

## 2024-04-17 DIAGNOSIS — F43.10 PTSD (POST-TRAUMATIC STRESS DISORDER): ICD-10-CM

## 2024-04-17 DIAGNOSIS — Z13.220 ENCOUNTER FOR SCREENING FOR LIPID DISORDER: ICD-10-CM

## 2024-04-17 DIAGNOSIS — Z86.19 HX OF HEPATITIS C: ICD-10-CM

## 2024-04-17 DIAGNOSIS — Z11.59 ENCOUNTER FOR HEPATITIS C SCREENING TEST FOR LOW RISK PATIENT: ICD-10-CM

## 2024-04-17 DIAGNOSIS — B34.9 VIRAL DISEASE: ICD-10-CM

## 2024-04-17 LAB
ALBUMIN SERPL BCP-MCNC: 3.9 G/DL (ref 3.5–5)
ALBUMIN/GLOB SERPL: 1.4 {RATIO}
ALP SERPL-CCNC: 98 U/L (ref 45–115)
ALT SERPL W P-5'-P-CCNC: 30 U/L (ref 16–61)
ANION GAP SERPL CALCULATED.3IONS-SCNC: 9 MMOL/L (ref 7–16)
AST SERPL W P-5'-P-CCNC: 25 U/L (ref 15–37)
BASOPHILS # BLD AUTO: 0.03 K/UL (ref 0–0.2)
BASOPHILS NFR BLD AUTO: 0.4 % (ref 0–1)
BILIRUB SERPL-MCNC: 0.4 MG/DL (ref ?–1.2)
BUN SERPL-MCNC: 15 MG/DL (ref 7–18)
BUN/CREAT SERPL: 12 (ref 6–20)
CALCIUM SERPL-MCNC: 9.2 MG/DL (ref 8.5–10.1)
CHLORIDE SERPL-SCNC: 109 MMOL/L (ref 98–107)
CHOLEST SERPL-MCNC: 157 MG/DL (ref 0–200)
CHOLEST/HDLC SERPL: 2.3 {RATIO}
CO2 SERPL-SCNC: 26 MMOL/L (ref 21–32)
CREAT SERPL-MCNC: 1.23 MG/DL (ref 0.7–1.3)
DIFFERENTIAL METHOD BLD: ABNORMAL
EGFR (NO RACE VARIABLE) (RUSH/TITUS): 77 ML/MIN/1.73M2
EOSINOPHIL # BLD AUTO: 0.09 K/UL (ref 0–0.5)
EOSINOPHIL NFR BLD AUTO: 1.1 % (ref 1–4)
ERYTHROCYTE [DISTWIDTH] IN BLOOD BY AUTOMATED COUNT: 11.9 % (ref 11.5–14.5)
EST. AVERAGE GLUCOSE BLD GHB EST-MCNC: 100 MG/DL
GLOBULIN SER-MCNC: 2.8 G/DL (ref 2–4)
GLUCOSE SERPL-MCNC: 95 MG/DL (ref 74–106)
HBA1C MFR BLD HPLC: 5.1 % (ref 4.5–6.6)
HCT VFR BLD AUTO: 43.8 % (ref 40–54)
HCV AB SER QL: REACTIVE
HDLC SERPL-MCNC: 69 MG/DL (ref 40–60)
HGB BLD-MCNC: 14.3 G/DL (ref 13.5–18)
HIV 1+O+2 AB SERPL QL: NORMAL
IMM GRANULOCYTES # BLD AUTO: 0.02 K/UL (ref 0–0.04)
IMM GRANULOCYTES NFR BLD: 0.2 % (ref 0–0.4)
LDLC SERPL CALC-MCNC: 61 MG/DL
LYMPHOCYTES # BLD AUTO: 1.26 K/UL (ref 1–4.8)
LYMPHOCYTES NFR BLD AUTO: 15.7 % (ref 27–41)
MCH RBC QN AUTO: 30.7 PG (ref 27–31)
MCHC RBC AUTO-ENTMCNC: 32.6 G/DL (ref 32–36)
MCV RBC AUTO: 94 FL (ref 80–96)
MONOCYTES # BLD AUTO: 0.61 K/UL (ref 0–0.8)
MONOCYTES NFR BLD AUTO: 7.6 % (ref 2–6)
MPC BLD CALC-MCNC: 10.5 FL (ref 9.4–12.4)
NEUTROPHILS # BLD AUTO: 6.01 K/UL (ref 1.8–7.7)
NEUTROPHILS NFR BLD AUTO: 75 % (ref 53–65)
NONHDLC SERPL-MCNC: 88 MG/DL
NRBC # BLD AUTO: 0 X10E3/UL
NRBC, AUTO (.00): 0 %
PLATELET # BLD AUTO: 289 K/UL (ref 150–400)
POTASSIUM SERPL-SCNC: 4.2 MMOL/L (ref 3.5–5.1)
PROT SERPL-MCNC: 6.7 G/DL (ref 6.4–8.2)
RBC # BLD AUTO: 4.66 M/UL (ref 4.6–6.2)
SODIUM SERPL-SCNC: 140 MMOL/L (ref 136–145)
TRIGL SERPL-MCNC: 135 MG/DL (ref 35–150)
VLDLC SERPL-MCNC: 27 MG/DL
WBC # BLD AUTO: 8.02 K/UL (ref 4.5–11)

## 2024-04-17 PROCEDURE — 99214 OFFICE O/P EST MOD 30 MIN: CPT | Mod: ,,, | Performed by: NURSE PRACTITIONER

## 2024-04-17 PROCEDURE — 80061 LIPID PANEL: CPT | Mod: ,,, | Performed by: CLINICAL MEDICAL LABORATORY

## 2024-04-17 PROCEDURE — 85025 COMPLETE CBC W/AUTO DIFF WBC: CPT | Mod: ,,, | Performed by: CLINICAL MEDICAL LABORATORY

## 2024-04-17 PROCEDURE — 36415 COLL VENOUS BLD VENIPUNCTURE: CPT | Performed by: NURSE PRACTITIONER

## 2024-04-17 PROCEDURE — 86803 HEPATITIS C AB TEST: CPT | Mod: ,,, | Performed by: CLINICAL MEDICAL LABORATORY

## 2024-04-17 PROCEDURE — 87389 HIV-1 AG W/HIV-1&-2 AB AG IA: CPT | Mod: ,,, | Performed by: CLINICAL MEDICAL LABORATORY

## 2024-04-17 PROCEDURE — 83036 HEMOGLOBIN GLYCOSYLATED A1C: CPT | Mod: ,,, | Performed by: CLINICAL MEDICAL LABORATORY

## 2024-04-17 PROCEDURE — 80053 COMPREHEN METABOLIC PANEL: CPT | Mod: ,,, | Performed by: CLINICAL MEDICAL LABORATORY

## 2024-04-17 RX ORDER — SILDENAFIL 100 MG/1
100 TABLET, FILM COATED ORAL
Qty: 30 TABLET | Refills: 2 | Status: SHIPPED | OUTPATIENT
Start: 2024-04-17

## 2024-04-17 RX ORDER — IBUPROFEN 600 MG/1
1 TABLET ORAL EVERY 4 HOURS PRN
COMMUNITY
End: 2024-04-17 | Stop reason: SDUPTHER

## 2024-04-17 RX ORDER — METHOCARBAMOL 500 MG/1
500 TABLET, FILM COATED ORAL DAILY PRN
Qty: 30 TABLET | Refills: 2 | Status: SHIPPED | OUTPATIENT
Start: 2024-04-17

## 2024-04-17 RX ORDER — GABAPENTIN 600 MG/1
600 TABLET ORAL NIGHTLY
Qty: 90 TABLET | Refills: 1 | Status: SHIPPED | OUTPATIENT
Start: 2024-04-17 | End: 2025-04-17

## 2024-04-17 RX ORDER — VALACYCLOVIR HYDROCHLORIDE 500 MG/1
500 TABLET, FILM COATED ORAL DAILY
Qty: 90 TABLET | Refills: 1 | Status: SHIPPED | OUTPATIENT
Start: 2024-04-17 | End: 2025-04-18

## 2024-04-17 RX ORDER — IBUPROFEN 600 MG/1
600 TABLET ORAL EVERY 4 HOURS PRN
Qty: 90 TABLET | Refills: 1 | Status: SHIPPED | OUTPATIENT
Start: 2024-04-17

## 2024-04-17 RX ORDER — FLUOXETINE HYDROCHLORIDE 20 MG/1
20 CAPSULE ORAL 2 TIMES DAILY
Qty: 60 CAPSULE | Refills: 2 | Status: SHIPPED | OUTPATIENT
Start: 2024-04-17 | End: 2024-05-22 | Stop reason: SDUPTHER

## 2024-04-17 RX ORDER — OMEPRAZOLE 20 MG/1
20 CAPSULE, DELAYED RELEASE ORAL DAILY
Qty: 90 CAPSULE | Refills: 1 | Status: SHIPPED | OUTPATIENT
Start: 2024-04-17

## 2024-04-17 NOTE — PROGRESS NOTES
Health Maintenance Due   Topic Date Due    Hepatitis C Screening  Never done    Lipid Panel  Never done    HIV Screening  Never done    Pneumococcal Vaccines (Age 0-64) (2 of 2 - PCV) 03/12/2005    Hemoglobin A1c (Diabetic Prevention Screening)  Never done    Influenza Vaccine (1) 09/01/2023    COVID-19 Vaccine (1 - 2023-24 season) Never done     Discussed care gaps with pt   Pt is not interested in any vaccines today  Will obtain blood screenings today

## 2024-04-18 ENCOUNTER — TELEPHONE (OUTPATIENT)
Dept: FAMILY MEDICINE | Facility: CLINIC | Age: 39
End: 2024-04-18
Payer: OTHER GOVERNMENT

## 2024-04-18 NOTE — PROGRESS NOTES
Pts father called inquiring on results.  I let him know that the pt would have to call me for results.

## 2024-04-18 NOTE — PROGRESS NOTES
Pt returned call about results.  States he's been told his hep c is reactive before and has had further evaluation in which he has been told twice now that he is negative. Isnt seeking further evaluation for this.

## 2024-04-18 NOTE — TELEPHONE ENCOUNTER
----- Message from JUAN Sifuentes sent at 4/18/2024 12:32 PM CDT -----  His hep c antibody is positive. It was checked as part of his health maintenance. I did not see where he has seen anyone in the past or been treated. Ask him if he has seen anyone before for this; we need to add a GI referral. Other labs are stable

## 2024-04-22 PROBLEM — Z86.19 HX OF HEPATITIS C: Status: ACTIVE | Noted: 2024-04-22

## 2024-04-22 PROBLEM — F41.9 ANXIETY AND DEPRESSION: Status: ACTIVE | Noted: 2024-04-22

## 2024-04-22 PROBLEM — F32.A ANXIETY AND DEPRESSION: Status: ACTIVE | Noted: 2024-04-22

## 2024-04-22 NOTE — ASSESSMENT & PLAN NOTE
Reports increasing anxiety and depression since recent loss of mother and grandmother  States he has stopped taking the risperdal because he does not like the way it makes him feel  Spoke with [facility]; they are not able to see him until next week  Will start prozac 20 mg daily  Advised him to not drink or take any other anti-psychotic/anxiety meds while taking this until following up with [facility]  Patient voices understanding and agreement

## 2024-04-22 NOTE — PROGRESS NOTES
JUAN Sifuentes   [facility] [id number] [place] [address]  [phone]      PATIENT NAME: Garrett Wright  : 1985  DATE: 24  MRN: [id number]      Billing Provider: JUAN Sifuentes  Level of Service:   Patient PCP Information       Provider PCP Type    JUAN Sifuentes General            Reason for Visit / Chief Complaint: Follow-up (Six month follow up ) and Medication Refill (Requesting medication refills )       Update PCP  Update Chief Complaint         History of Present Illness / Problem Focused Workflow     39 year old male presents for follow up   Requesting medication refills  Complaints of increasing anxiety and depression over the last few weeks  States he recently lose his mother and grandmother  Also being treated by Dr Holland for left hand fracture    Review of Systems     Review of Systems   Constitutional:  Positive for fatigue. Negative for fever.   HENT:  Negative for congestion.    Respiratory:  Negative for cough and shortness of breath.    Cardiovascular:  Negative for chest pain.   Gastrointestinal:  Negative for abdominal pain, constipation, diarrhea and vomiting.   Endocrine: Negative for polydipsia and polyuria.   Musculoskeletal:  Positive for arthralgias. Negative for gait problem.        Current left hand fracture    Allergic/Immunologic: Negative for environmental allergies.   Neurological:  Negative for dizziness, weakness and headaches.   Psychiatric/Behavioral:  Positive for dysphoric mood and sleep disturbance. The patient is not nervous/anxious.        Medical / Social / Family History     Past Medical History:   Diagnosis Date    Male erectile dysfunction, unspecified     PTSD (post-traumatic stress disorder)        Past Surgical History:   Procedure Laterality Date    ARTHROSCOPIC ACROMIOPLASTY OF SHOULDER Right 2023    Procedure: ACROMIOPLASTY, ARTHROSCOPIC;  Surgeon: Eb Mascorro,  MD;  Location: [facility] OR;  Service: Orthopedics;  Laterality: Right;    ARTHROSCOPIC DEBRIDEMENT OF SHOULDER Right 9/21/2023    Procedure: DEBRIDEMENT, SHOULDER, ARTHROSCOPIC;  Surgeon: Eb Mascorro MD;  Location: [facility] OR;  Service: Orthopedics;  Laterality: Right;    ARTHROSCOPIC REPAIR OF ROTATOR CUFF OF SHOULDER Right 9/21/2023    Procedure: REPAIR, ROTATOR CUFF, ARTHROSCOPIC;  Surgeon: Eb Mascorro MD;  Location: [facility] OR;  Service: Orthopedics;  Laterality: Right;    ARTHROSCOPY OF SHOULDER WITH DECOMPRESSION OF SUBACROMIAL SPACE Right 9/21/2023    Procedure: ARTHROSCOPY, SHOULDER, WITH SUBACROMIAL SPACE DECOMPRESSION;  Surgeon: Eb Mascorro MD;  Location: [facility] OR;  Service: Orthopedics;  Laterality: Right;    ARTHROSCOPY OF SHOULDER WITH REMOVAL OF DISTAL CLAVICLE Right 9/21/2023    Procedure: ARTHROSCOPY, SHOULDER, WITH DISTAL CLAVICLE EXCISION;  Surgeon: Eb Mascorro MD;  Location: [facility] OR;  Service: Orthopedics;  Laterality: Right;    SHOULDER ARTHROSCOPY Right 9/21/2023    Procedure: ARTHROSCOPY, SHOULDER;  Surgeon: Eb Mascorro MD;  Location: [facility] OR;  Service: Orthopedics;  Laterality: Right;       Social History    reports that he has been smoking cigarettes. He has never used smokeless tobacco. He reports that he does not currently use alcohol. He reports current drug use. Drug: Marijuana.    Family History  's family history includes Cancer in his mother.    Medications and Allergies     Medications  Current Outpatient Medications   Medication Sig Dispense Refill    terbinafine HCL (LAMISIL) 250 mg tablet       FLUoxetine 20 MG capsule Take 1 capsule (20 mg total) by mouth 2 (two) times daily. 60 capsule 2    gabapentin (NEURONTIN) 600 MG tablet Take 1 tablet (600 mg total) by mouth nightly. 90 tablet 1    ibuprofen (ADVIL,MOTRIN) 600 MG tablet Take 1 tablet (600 mg total) by mouth every 4  (four) hours as needed for Pain. 90 tablet 1    methocarbamoL (ROBAXIN) 500 MG Tab Take 1 tablet (500 mg total) by mouth daily as needed (shoulder pain). 30 tablet 2    omeprazole (PRILOSEC) 20 MG capsule Take 1 capsule (20 mg total) by mouth once daily. 90 capsule 1    sildenafiL (VIAGRA) 100 MG tablet Take 1 tablet (100 mg total) by mouth as needed for Erectile Dysfunction. 30 tablet 2    valACYclovir (VALTREX) 500 MG tablet Take 1 tablet (500 mg total) by mouth once daily. 90 tablet 1     No current facility-administered medications for this visit.       Allergies  Review of patient's allergies indicates:  No Known Allergies    Physical Examination     Vitals:    04/17/24 1510   BP: 128/82   Pulse: 87   Resp: 18   Temp: 98 °F (36.7 °C)     Physical Exam  Constitutional:       General: He is not in acute distress.  HENT:      Nose: Nose normal.      Mouth/Throat:      Mouth: Mucous membranes are moist.   Eyes:      Extraocular Movements: Extraocular movements intact.   Cardiovascular:      Rate and Rhythm: Normal rate.   Pulmonary:      Effort: Pulmonary effort is normal. No respiratory distress.   Abdominal:      General: Bowel sounds are normal.      Palpations: Abdomen is soft.      Tenderness: There is no abdominal tenderness.   Musculoskeletal:         General: Normal range of motion.      Cervical back: Normal range of motion.      Comments: Splint to left hand   Skin:     General: Skin is warm.   Neurological:      Mental Status: He is alert.           Imaging / Labs     Office Visit on 04/17/2024   Component Date Value Ref Range Status    Hepatitis C Ab 04/17/2024 Reactive (A)  Non-Reactive Final    Triglycerides 04/17/2024 135  35 - 150 mg/dL Final    Cholesterol 04/17/2024 157  0 - 200 mg/dL Final    HDL Cholesterol 04/17/2024 69 (H)  40 - 60 mg/dL Final    Cholesterol/HDL Ratio (Risk Factor) 04/17/2024 2.3   Final    Non-HDL 04/17/2024 88  mg/dL Final    LDL Calculated 04/17/2024 61  mg/dL Final    VLDL  04/17/2024 27  mg/dL Final    Hemoglobin A1C 04/17/2024 5.1  4.5 - 6.6 % Final    Estimated Average Glucose 04/17/2024 100  mg/dL Final    HIV 1/2 04/17/2024 Non-Reactive  Non-Reactive Final    Sodium 04/17/2024 140  136 - 145 mmol/L Final    Potassium 04/17/2024 4.2  3.5 - 5.1 mmol/L Final    Chloride 04/17/2024 109 (H)  98 - 107 mmol/L Final    CO2 04/17/2024 26  21 - 32 mmol/L Final    Anion Gap 04/17/2024 9  7 - 16 mmol/L Final    Glucose 04/17/2024 95  74 - 106 mg/dL Final    BUN 04/17/2024 15  7 - 18 mg/dL Final    Creatinine 04/17/2024 1.23  0.70 - 1.30 mg/dL Final    BUN/Creatinine Ratio 04/17/2024 12  6 - 20 Final    Calcium 04/17/2024 9.2  8.5 - 10.1 mg/dL Final    Total Protein 04/17/2024 6.7  6.4 - 8.2 g/dL Final    Albumin 04/17/2024 3.9  3.5 - 5.0 g/dL Final    Globulin 04/17/2024 2.8  2.0 - 4.0 g/dL Final    A/G Ratio 04/17/2024 1.4   Final    Bilirubin, Total 04/17/2024 0.4  >0.0 - 1.2 mg/dL Final    Alk Phos 04/17/2024 98  45 - 115 U/L Final    ALT 04/17/2024 30  16 - 61 U/L Final    AST 04/17/2024 25  15 - 37 U/L Final    eGFR 04/17/2024 77  >=60 mL/min/1.73m2 Final    WBC 04/17/2024 8.02  4.50 - 11.00 K/uL Final    RBC 04/17/2024 4.66  4.60 - 6.20 M/uL Final    Hemoglobin 04/17/2024 14.3  13.5 - 18.0 g/dL Final    Hematocrit 04/17/2024 43.8  40.0 - 54.0 % Final    MCV 04/17/2024 94.0  80.0 - 96.0 fL Final    MCH 04/17/2024 30.7  27.0 - 31.0 pg Final    MCHC 04/17/2024 32.6  32.0 - 36.0 g/dL Final    RDW 04/17/2024 11.9  11.5 - 14.5 % Final    Platelet Count 04/17/2024 289  150 - 400 K/uL Final    MPV 04/17/2024 10.5  9.4 - 12.4 fL Final    Neutrophils % 04/17/2024 75.0 (H)  53.0 - 65.0 % Final    Lymphocytes % 04/17/2024 15.7 (L)  27.0 - 41.0 % Final    Monocytes % 04/17/2024 7.6 (H)  2.0 - 6.0 % Final    Eosinophils % 04/17/2024 1.1  1.0 - 4.0 % Final    Basophils % 04/17/2024 0.4  0.0 - 1.0 % Final    Immature Granulocytes % 04/17/2024 0.2  0.0 - 0.4 % Final    nRBC, Auto 04/17/2024 0.0  <=0.0 %  Final    Neutrophils, Abs 04/17/2024 6.01  1.80 - 7.70 K/uL Final    Lymphocytes, Absolute 04/17/2024 1.26  1.00 - 4.80 K/uL Final    Monocytes, Absolute 04/17/2024 0.61  0.00 - 0.80 K/uL Final    Eosinophils, Absolute 04/17/2024 0.09  0.00 - 0.50 K/uL Final    Basophils, Absolute 04/17/2024 0.03  0.00 - 0.20 K/uL Final    Immature Granulocytes, Absolute 04/17/2024 0.02  0.00 - 0.04 K/uL Final    nRBC, Absolute 04/17/2024 0.00  <=0.00 x10e3/uL Final    Diff Type 04/17/2024 Auto   Final     X-Ray Hand 3 view Left  See Procedure Notes for results.     IMPRESSION: Please see Ortho procedure notes for report.      This procedure was auto-finalized by: Virtual Radiologist      Assessment and Plan (including Health Maintenance)      Problem List  Smart Sets  Document Outside HM   :    Health Maintenance Due   Topic Date Due    Pneumococcal Vaccines (Age 0-64) (2 of 2 - PCV) 03/12/2005    Influenza Vaccine (1) 09/01/2023    COVID-19 Vaccine (1 - 2023-24 season) Never done       Problem List Items Addressed This Visit          Neuro    Neuropathy    Relevant Medications    gabapentin (NEURONTIN) 600 MG tablet       Psychiatric    PTSD (post-traumatic stress disorder)    Relevant Orders    Ambulatory referral/consult to Psychiatry    Anxiety and depression - Primary    Current Assessment & Plan     Reports increasing anxiety and depression since recent loss of mother and grandmother  States he has stopped taking the risperdal because he does not like the way it makes him feel  Spoke with [place] [facility]; they are not able to see him until next week  Will start prozac 20 mg daily  Advised him to not drink or take any other anti-psychotic/anxiety meds while taking this until following up with [facility]  Patient voices understanding and agreement          Relevant Medications    FLUoxetine 20 MG capsule    Other Relevant Orders    Ambulatory referral/consult to Psychiatry       Renal/    Erectile dysfunction    Relevant Medications     sildenafiL (VIAGRA) 100 MG tablet       GI    Gastroesophageal reflux disease    Current Assessment & Plan     Discussed diet and avoiding spicy foods  Condition is stable  Continue current meds          Relevant Medications    omeprazole (PRILOSEC) 20 MG capsule    Hx of hepatitis C    Current Assessment & Plan     Reports he has been told in the past he has had hep C and had gotten treatment at that time             Orthopedic    Muscle spasm    Relevant Medications    methocarbamoL (ROBAXIN) 500 MG Tab     Other Visit Diagnoses       Encounter for screening for lipid disorder        Relevant Orders    Lipid Panel (Completed)    Health care maintenance        Relevant Orders    Hemoglobin A1C (Completed)    CBC Auto Differential (Completed)    Comprehensive Metabolic Panel (Completed)    Encounter for hepatitis C screening test for low risk patient        Relevant Orders    Hepatitis C Antibody (Completed)    Hepatitis C Virus Quantitative    Screening for HIV (human immunodeficiency virus)        Relevant Orders    HIV 1/2 Ag/Ab (4th Gen) (Completed)    Viral disease        Relevant Medications    valACYclovir (VALTREX) 500 MG tablet            Health Maintenance Topics with due status: Not Due       Topic Last Completion Date    TETANUS VACCINE 03/27/2018    Lipid Panel 04/17/2024       Future Appointments   Date Time Provider Department Center   4/29/2024  1:40 PM Shabnam Membreno PA O[facility]   5/16/2024  2:40 PM Jacklyn Wolf FNP [facility]          Signature:  JUAN Sifuentes [facility] [address] [place] [address]  [phone]    Date of encounter: 4/17/24

## 2024-04-29 ENCOUNTER — OFFICE VISIT (OUTPATIENT)
Dept: ORTHOPEDICS | Facility: CLINIC | Age: 39
End: 2024-04-29
Payer: OTHER GOVERNMENT

## 2024-04-29 ENCOUNTER — HOSPITAL ENCOUNTER (OUTPATIENT)
Dept: RADIOLOGY | Facility: HOSPITAL | Age: 39
Discharge: HOME | End: 2024-04-29
Payer: OTHER GOVERNMENT

## 2024-04-29 DIAGNOSIS — S62.339D CLOSED BOXER'S FRACTURE WITH ROUTINE HEALING, SUBSEQUENT ENCOUNTER: Primary | ICD-10-CM

## 2024-04-29 DIAGNOSIS — S62.339D CLOSED BOXER'S FRACTURE WITH ROUTINE HEALING, SUBSEQUENT ENCOUNTER: ICD-10-CM

## 2024-04-29 PROCEDURE — 73130 X-RAY EXAM OF HAND: CPT | Mod: 26,LT,, | Performed by: RADIOLOGY

## 2024-04-29 PROCEDURE — 99213 OFFICE O/P EST LOW 20 MIN: CPT | Mod: PBBFAC,25

## 2024-04-29 PROCEDURE — 73130 X-RAY EXAM OF HAND: CPT | Mod: TC,LT

## 2024-04-29 PROCEDURE — 99024 POSTOP FOLLOW-UP VISIT: CPT | Mod: S$PBB,,,

## 2024-04-29 NOTE — PROGRESS NOTES
CC:   Chief Complaint   Patient presents with    Left Hand - Follow-up          Garrett Wright is a 39 y.o. male seen today for follow up of Follow-up of the Left Hand  Patient with known boxer's fracture left hand last seen by Dr. Mascorro on 04/15 presents today for continued follow up of fracture.  Patient has no complaints.        PAST MEDICAL HISTORY:   Past Medical History:   Diagnosis Date    Male erectile dysfunction, unspecified     PTSD (post-traumatic stress disorder)         PAST SURGICAL HISTORY:   Past Surgical History:   Procedure Laterality Date    ARTHROSCOPIC ACROMIOPLASTY OF SHOULDER Right 9/21/2023    Procedure: ACROMIOPLASTY, ARTHROSCOPIC;  Surgeon: Eb Mascorro MD;  Location: [facility] OR;  Service: Orthopedics;  Laterality: Right;    ARTHROSCOPIC DEBRIDEMENT OF SHOULDER Right 9/21/2023    Procedure: DEBRIDEMENT, SHOULDER, ARTHROSCOPIC;  Surgeon: Eb Mascorro MD;  Location: [facility] OR;  Service: Orthopedics;  Laterality: Right;    ARTHROSCOPIC REPAIR OF ROTATOR CUFF OF SHOULDER Right 9/21/2023    Procedure: REPAIR, ROTATOR CUFF, ARTHROSCOPIC;  Surgeon: Eb Mascorro MD;  Location: [facility] OR;  Service: Orthopedics;  Laterality: Right;    ARTHROSCOPY OF SHOULDER WITH DECOMPRESSION OF SUBACROMIAL SPACE Right 9/21/2023    Procedure: ARTHROSCOPY, SHOULDER, WITH SUBACROMIAL SPACE DECOMPRESSION;  Surgeon: Eb Mascorro MD;  Location: [facility] OR;  Service: Orthopedics;  Laterality: Right;    ARTHROSCOPY OF SHOULDER WITH REMOVAL OF DISTAL CLAVICLE Right 9/21/2023    Procedure: ARTHROSCOPY, SHOULDER, WITH DISTAL CLAVICLE EXCISION;  Surgeon: Eb Mascorro MD;  Location: [facility] OR;  Service: Orthopedics;  Laterality: Right;    SHOULDER ARTHROSCOPY Right 9/21/2023    Procedure: ARTHROSCOPY, SHOULDER;  Surgeon: Eb Mascorro MD;  Location: [facility] OR;  Service: Orthopedics;  Laterality: Right;         ALLERGIES: Review of patient's allergies indicates:  No Known Allergies     MEDICATIONS :    Current Outpatient Medications:     FLUoxetine 20 MG capsule, Take 1 capsule (20 mg total) by mouth 2 (two) times daily., Disp: 60 capsule, Rfl: 2    gabapentin (NEURONTIN) 600 MG tablet, Take 1 tablet (600 mg total) by mouth nightly., Disp: 90 tablet, Rfl: 1    ibuprofen (ADVIL,MOTRIN) 600 MG tablet, Take 1 tablet (600 mg total) by mouth every 4 (four) hours as needed for Pain., Disp: 90 tablet, Rfl: 1    methocarbamoL (ROBAXIN) 500 MG Tab, Take 1 tablet (500 mg total) by mouth daily as needed (shoulder pain)., Disp: 30 tablet, Rfl: 2    omeprazole (PRILOSEC) 20 MG capsule, Take 1 capsule (20 mg total) by mouth once daily., Disp: 90 capsule, Rfl: 1    sildenafiL (VIAGRA) 100 MG tablet, Take 1 tablet (100 mg total) by mouth as needed for Erectile Dysfunction., Disp: 30 tablet, Rfl: 2    terbinafine HCL (LAMISIL) 250 mg tablet, , Disp: , Rfl:     valACYclovir (VALTREX) 500 MG tablet, Take 1 tablet (500 mg total) by mouth once daily., Disp: 90 tablet, Rfl: 1     SOCIAL HISTORY:   Social History     Socioeconomic History    Marital status: Single   Tobacco Use    Smoking status: Every Day     Current packs/day: 0.25     Types: Cigarettes    Smokeless tobacco: Never   Substance and Sexual Activity    Alcohol use: Not Currently    Drug use: Yes     Types: Marijuana    Sexual activity: Yes        FAMILY HISTORY:   Family History   Problem Relation Name Age of Onset    Cancer Mother            PHYSICAL EXAM:      There were no vitals filed for this visit.  There is no height or weight on file to calculate BMI.    GENERAL: Well-developed, well-nourished male . The patient is alert, oriented and cooperative.    EXTREMITIES:  Left hand nontender to palpation over 5th metacarpal, good range of motion of 5th finger, capillary refill less than 3 seconds, neurovascularly intact      RADIOGRAPHIC FINDINGS:   X-Ray Hand 3 View  Left    Result Date: 4/29/2024  EXAMINATION: XR HAND COMPLETE 3 VIEW LEFT CLINICAL HISTORY: Displaced fracture of neck of unspecified metacarpal bone, subsequent encounter for fracture with routine healing COMPARISON: None available TECHNIQUE: XR HAND 3 VIEW LEFT FINDINGS: Previous fracture of the distal 5th metacarpal appears similar to previous.  No other evidence of fracture seen.  The alignment of the joints appears normal.  Moderate to severe 1st carpometacarpal joint degenerative change is present.  No soft tissue abnormality is seen.     Fifth metacarpal fracture appears similar to previous. Electronically signed by: Braden Kaba Date:    04/29/2024 Time:    15:04    X-Ray Hand 3 view Left    Result Date: 4/15/2024  See Procedure Notes for results. IMPRESSION: Please see Ortho procedure notes for report.  This procedure was auto-finalized by: Michel Radiologist    X-Ray Hand 3 view Left    Result Date: 4/9/2024  EXAMINATION: XR HAND COMPLETE 3 VIEW LEFT CLINICAL HISTORY: hand injury; COMPARISON: Left hand x-ray August 30, 2022 TECHNIQUE: Frontal, lateral, and oblique views of the left hand. FINDINGS: Soft tissue swelling.  Acute, mildly displaced fracture involving the distal metaphysis of the 5th metacarpal.  Mild dorsal apex angulation.  Moderate degenerative change and chronic ossicles about the 1st CMC joint.     As above. Point of Service: [facility] Electronically signed by: Caleb Velasquez Date:    04/09/2024 Time:    13:47       Patient Active Problem List    Diagnosis Date Noted    Anxiety and depression 04/22/2024    Hx of hepatitis C 04/22/2024    Boxer's metacarpal fracture, neck, closed 04/15/2024    Encounter for medication refill 10/31/2023    Neuropathy 10/31/2023    Traumatic complete tear of right rotator cuff 09/11/2023    Gastroesophageal reflux disease 04/12/2023    Muscle spasm 04/12/2023    PTSD (post-traumatic stress disorder) 04/12/2023    Erectile dysfunction  04/12/2023    Lipoma of neck 07/06/2022    Ganglion cyst of tendon sheath of left hand 07/06/2022     IMPRESSION AND PLAN:  Boxer's fracture left hand.  Personally reviewed x-rays today with continued healing of 5th metacarpal fracture, displacement and angulation similar to previous x-rays.  Follow up with either me or Dr. Mascorro in 2-3 weeks for final x-ray.  Otherwise sooner p.r.n..        No follow-ups on file.       Shabanm Membreno PA-C      (Subject to voice recognition error, transcription service not allowed)

## 2024-05-02 ENCOUNTER — HOSPITAL ENCOUNTER (EMERGENCY)
Facility: HOSPITAL | Age: 39
Discharge: HOME | End: 2024-05-03
Attending: EMERGENCY MEDICINE
Payer: OTHER GOVERNMENT

## 2024-05-02 VITALS
DIASTOLIC BLOOD PRESSURE: 92 MMHG | BODY MASS INDEX: 23.74 KG/M2 | RESPIRATION RATE: 18 BRPM | SYSTOLIC BLOOD PRESSURE: 141 MMHG | HEART RATE: 78 BPM | TEMPERATURE: 98 F | OXYGEN SATURATION: 100 % | WEIGHT: 185 LBS | HEIGHT: 74 IN

## 2024-05-02 DIAGNOSIS — L03.114 CELLULITIS OF LEFT HAND: Primary | ICD-10-CM

## 2024-05-02 PROCEDURE — 99284 EMERGENCY DEPT VISIT MOD MDM: CPT

## 2024-05-02 PROCEDURE — 99284 EMERGENCY DEPT VISIT MOD MDM: CPT | Mod: ,,, | Performed by: EMERGENCY MEDICINE

## 2024-05-03 PROCEDURE — 63600175 PHARM REV CODE 636 W HCPCS: Performed by: EMERGENCY MEDICINE

## 2024-05-03 PROCEDURE — 96372 THER/PROPH/DIAG INJ SC/IM: CPT | Performed by: EMERGENCY MEDICINE

## 2024-05-03 RX ORDER — CLINDAMYCIN HYDROCHLORIDE 150 MG/1
300 CAPSULE ORAL 4 TIMES DAILY
Qty: 80 CAPSULE | Refills: 0 | Status: SHIPPED | OUTPATIENT
Start: 2024-05-03 | End: 2024-05-13

## 2024-05-03 RX ORDER — CEFTRIAXONE 1 G/1
1 INJECTION, POWDER, FOR SOLUTION INTRAMUSCULAR; INTRAVENOUS
Status: COMPLETED | OUTPATIENT
Start: 2024-05-03 | End: 2024-05-03

## 2024-05-03 RX ADMIN — CEFTRIAXONE SODIUM 1 G: 1 INJECTION, POWDER, FOR SOLUTION INTRAMUSCULAR; INTRAVENOUS at 12:05

## 2024-05-03 NOTE — ED TRIAGE NOTES
Patient arrives for laceration on his left hand that happened 2-3 days ago; he also recently had a fracture of that hand and now has swelling.

## 2024-05-03 NOTE — DISCHARGE INSTRUCTIONS
Take antibiotics as prescribed.  Return to emergency department for any worsening or further problems or symptoms are not improving.

## 2024-05-22 ENCOUNTER — OFFICE VISIT (OUTPATIENT)
Dept: FAMILY MEDICINE | Facility: CLINIC | Age: 39
End: 2024-05-22
Payer: OTHER GOVERNMENT

## 2024-05-22 VITALS
BODY MASS INDEX: 23.87 KG/M2 | HEIGHT: 74 IN | SYSTOLIC BLOOD PRESSURE: 120 MMHG | HEART RATE: 93 BPM | WEIGHT: 186 LBS | DIASTOLIC BLOOD PRESSURE: 84 MMHG | RESPIRATION RATE: 18 BRPM | OXYGEN SATURATION: 97 % | TEMPERATURE: 98 F

## 2024-05-22 DIAGNOSIS — F41.9 ANXIETY AND DEPRESSION: Primary | ICD-10-CM

## 2024-05-22 DIAGNOSIS — Z86.19 HX OF HEPATITIS C: ICD-10-CM

## 2024-05-22 DIAGNOSIS — F32.A ANXIETY AND DEPRESSION: Primary | ICD-10-CM

## 2024-05-22 PROCEDURE — 99213 OFFICE O/P EST LOW 20 MIN: CPT | Mod: ,,, | Performed by: NURSE PRACTITIONER

## 2024-05-22 RX ORDER — FLUOXETINE 10 MG/1
10 CAPSULE ORAL DAILY
Qty: 90 CAPSULE | Refills: 1 | Status: SHIPPED | OUTPATIENT
Start: 2024-05-22 | End: 2024-05-28 | Stop reason: SDUPTHER

## 2024-05-22 RX ORDER — FLUOXETINE HYDROCHLORIDE 20 MG/1
20 CAPSULE ORAL 2 TIMES DAILY
Qty: 90 CAPSULE | Refills: 1 | Status: SHIPPED | OUTPATIENT
Start: 2024-05-22 | End: 2025-05-22

## 2024-05-22 NOTE — PROGRESS NOTES
Health Maintenance Due   Topic Date Due    Pneumococcal Vaccines (Age 0-64) (2 of 2 - PCV) 03/12/2005    COVID-19 Vaccine (1 - 2023-24 season) Never done     Discussed care gaps.  Not interested in vaccs.     X Size Of Lesion In Cm (Optional): 0.5

## 2024-05-28 DIAGNOSIS — F41.9 ANXIETY AND DEPRESSION: ICD-10-CM

## 2024-05-28 DIAGNOSIS — F32.A ANXIETY AND DEPRESSION: ICD-10-CM

## 2024-05-28 RX ORDER — FLUOXETINE 10 MG/1
10 CAPSULE ORAL 2 TIMES DAILY
Qty: 90 CAPSULE | Refills: 1 | Status: SHIPPED | OUTPATIENT
Start: 2024-05-28 | End: 2025-05-28

## 2024-05-29 NOTE — PROGRESS NOTES
JUAN Sifuentes   [facility] [address] [address]  [phone]      PATIENT NAME: Garrett Wright  : 1985  DATE: 24  MRN: [id number]      Billing Provider: JUAN Sifuentes  Level of Service:   Patient PCP Information       Provider PCP Type    JUAN Sifuentes General            Reason for Visit / Chief Complaint: Follow-up (1 month fu.) and Anxiety (Last visit started prozac 20mg BID./Has noticed some improvement but is interested in a dose increase.)       Update PCP  Update Chief Complaint         History of Present Illness / Problem Focused Workflow     39 year old male presents for follow up since being started on prozac  States he has had some improvement but needs dose increase   Denies any other complaints       Review of Systems     Review of Systems   Constitutional:  Positive for fatigue. Negative for fever.   HENT:  Negative for congestion.    Respiratory:  Negative for cough and shortness of breath.    Cardiovascular:  Negative for chest pain.   Gastrointestinal:  Negative for abdominal pain, constipation, diarrhea and vomiting.   Endocrine: Negative for polydipsia and polyuria.   Musculoskeletal:  Positive for arthralgias. Negative for gait problem.   Allergic/Immunologic: Negative for environmental allergies.   Neurological:  Negative for dizziness, weakness and headaches.   Psychiatric/Behavioral:  Positive for dysphoric mood and sleep disturbance. Negative for agitation. The patient is not nervous/anxious.        Medical / Social / Family History     Past Medical History:   Diagnosis Date    Male erectile dysfunction, unspecified     PTSD (post-traumatic stress disorder)        Past Surgical History:   Procedure Laterality Date    ARTHROSCOPIC ACROMIOPLASTY OF SHOULDER Right 2023    Procedure: ACROMIOPLASTY, ARTHROSCOPIC;  Surgeon: Eb Mascorro MD;  Location: [facility];  Service:  Orthopedics;  Laterality: Right;    ARTHROSCOPIC DEBRIDEMENT OF SHOULDER Right 9/21/2023    Procedure: DEBRIDEMENT, SHOULDER, ARTHROSCOPIC;  Surgeon: Eb Mascorro MD;  [facility];  Service: Orthopedics;  Laterality: Right;    ARTHROSCOPIC REPAIR OF ROTATOR CUFF OF SHOULDER Right 9/21/2023    Procedure: REPAIR, ROTATOR CUFF, ARTHROSCOPIC;  Surgeon: Eb Mascorro MD;  [facility];  Service: Orthopedics;  Laterality: Right;    ARTHROSCOPY OF SHOULDER WITH DECOMPRESSION OF SUBACROMIAL SPACE Right 9/21/2023    Procedure: ARTHROSCOPY, SHOULDER, WITH SUBACROMIAL SPACE DECOMPRESSION;  Surgeon: Eb Mascorro MD;  [facility];  Service: Orthopedics;  Laterality: Right;    ARTHROSCOPY OF SHOULDER WITH REMOVAL OF DISTAL CLAVICLE Right 9/21/2023    Procedure: ARTHROSCOPY, SHOULDER, WITH DISTAL CLAVICLE EXCISION;  Surgeon: Eb Mascorro MD;  [facility];  Service: Orthopedics;  Laterality: Right;    SHOULDER ARTHROSCOPY Right 9/21/2023    Procedure: ARTHROSCOPY, SHOULDER;  Surgeon: Eb Mascorro MD;  Location: [facility] OR;  Service: Orthopedics;  Laterality: Right;       Social History    reports that he has been smoking cigarettes. He has never used smokeless tobacco. He reports that he does not currently use alcohol. He reports current drug use. Drug: Marijuana.    Family History  's family history includes Cancer in his mother.    Medications and Allergies     Medications  Outpatient Medications Marked as Taking for the 5/22/24 encounter (Office Visit) with Jacklyn Wolf FNP   Medication Sig Dispense Refill    gabapentin (NEURONTIN) 600 MG tablet Take 1 tablet (600 mg total) by mouth nightly. 90 tablet 1    ibuprofen (ADVIL,MOTRIN) 600 MG tablet Take 1 tablet (600 mg total) by mouth every 4 (four) hours as needed for Pain. 90 tablet 1    methocarbamoL (ROBAXIN) 500 MG Tab Take 1 tablet (500 mg total) by mouth  daily as needed (shoulder pain). 30 tablet 2    omeprazole (PRILOSEC) 20 MG capsule Take 1 capsule (20 mg total) by mouth once daily. 90 capsule 1    sildenafiL (VIAGRA) 100 MG tablet Take 1 tablet (100 mg total) by mouth as needed for Erectile Dysfunction. 30 tablet 2    terbinafine HCL (LAMISIL) 250 mg tablet       valACYclovir (VALTREX) 500 MG tablet Take 1 tablet (500 mg total) by mouth once daily. 90 tablet 1    [DISCONTINUED] FLUoxetine 20 MG capsule Take 1 capsule (20 mg total) by mouth 2 (two) times daily. 60 capsule 2       Allergies  Review of patient's allergies indicates:  No Known Allergies    Physical Examination     Vitals:    05/22/24 1409   BP: 120/84   Pulse: 93   Resp: 18   Temp: 97.6 °F (36.4 °C)     Physical Exam  Constitutional:       General: He is not in acute distress.  HENT:      Nose: Nose normal.      Mouth/Throat:      Mouth: Mucous membranes are moist.   Eyes:      Extraocular Movements: Extraocular movements intact.   Cardiovascular:      Rate and Rhythm: Normal rate.   Pulmonary:      Effort: Pulmonary effort is normal. No respiratory distress.   Abdominal:      General: Bowel sounds are normal.      Palpations: Abdomen is soft.      Tenderness: There is no abdominal tenderness.   Musculoskeletal:         General: Normal range of motion.      Cervical back: Normal range of motion.   Skin:     General: Skin is warm.   Neurological:      Mental Status: He is alert.   Psychiatric:         Behavior: Behavior normal.           Imaging / Labs     No visits with results within 1 Day(s) from this visit.   Latest known visit with results is:   Office Visit on 04/17/2024   Component Date Value Ref Range Status    Hepatitis C Ab 04/17/2024 Reactive (A)  Non-Reactive Final    Triglycerides 04/17/2024 135  35 - 150 mg/dL Final    Cholesterol 04/17/2024 157  0 - 200 mg/dL Final    HDL Cholesterol 04/17/2024 69 (H)  40 - 60 mg/dL Final    Cholesterol/HDL Ratio (Risk Factor) 04/17/2024 2.3   Final     Non-HDL 04/17/2024 88  mg/dL Final    LDL Calculated 04/17/2024 61  mg/dL Final    VLDL 04/17/2024 27  mg/dL Final    Hemoglobin A1C 04/17/2024 5.1  4.5 - 6.6 % Final    Estimated Average Glucose 04/17/2024 100  mg/dL Final    HIV 1/2 04/17/2024 Non-Reactive  Non-Reactive Final    Sodium 04/17/2024 140  136 - 145 mmol/L Final    Potassium 04/17/2024 4.2  3.5 - 5.1 mmol/L Final    Chloride 04/17/2024 109 (H)  98 - 107 mmol/L Final    CO2 04/17/2024 26  21 - 32 mmol/L Final    Anion Gap 04/17/2024 9  7 - 16 mmol/L Final    Glucose 04/17/2024 95  74 - 106 mg/dL Final    BUN 04/17/2024 15  7 - 18 mg/dL Final    Creatinine 04/17/2024 1.23  0.70 - 1.30 mg/dL Final    BUN/Creatinine Ratio 04/17/2024 12  6 - 20 Final    Calcium 04/17/2024 9.2  8.5 - 10.1 mg/dL Final    Total Protein 04/17/2024 6.7  6.4 - 8.2 g/dL Final    Albumin 04/17/2024 3.9  3.5 - 5.0 g/dL Final    Globulin 04/17/2024 2.8  2.0 - 4.0 g/dL Final    A/G Ratio 04/17/2024 1.4   Final    Bilirubin, Total 04/17/2024 0.4  >0.0 - 1.2 mg/dL Final    Alk Phos 04/17/2024 98  45 - 115 U/L Final    ALT 04/17/2024 30  16 - 61 U/L Final    AST 04/17/2024 25  15 - 37 U/L Final    eGFR 04/17/2024 77  >=60 mL/min/1.73m2 Final    WBC 04/17/2024 8.02  4.50 - 11.00 K/uL Final    RBC 04/17/2024 4.66  4.60 - 6.20 M/uL Final    Hemoglobin 04/17/2024 14.3  13.5 - 18.0 g/dL Final    Hematocrit 04/17/2024 43.8  40.0 - 54.0 % Final    MCV 04/17/2024 94.0  80.0 - 96.0 fL Final    MCH 04/17/2024 30.7  27.0 - 31.0 pg Final    MCHC 04/17/2024 32.6  32.0 - 36.0 g/dL Final    RDW 04/17/2024 11.9  11.5 - 14.5 % Final    Platelet Count 04/17/2024 289  150 - 400 K/uL Final    MPV 04/17/2024 10.5  9.4 - 12.4 fL Final    Neutrophils % 04/17/2024 75.0 (H)  53.0 - 65.0 % Final    Lymphocytes % 04/17/2024 15.7 (L)  27.0 - 41.0 % Final    Monocytes % 04/17/2024 7.6 (H)  2.0 - 6.0 % Final    Eosinophils % 04/17/2024 1.1  1.0 - 4.0 % Final    Basophils % 04/17/2024 0.4  0.0 - 1.0 % Final     Immature Granulocytes % 04/17/2024 0.2  0.0 - 0.4 % Final    nRBC, Auto 04/17/2024 0.0  <=0.0 % Final    Neutrophils, Abs 04/17/2024 6.01  1.80 - 7.70 K/uL Final    Lymphocytes, Absolute 04/17/2024 1.26  1.00 - 4.80 K/uL Final    Monocytes, Absolute 04/17/2024 0.61  0.00 - 0.80 K/uL Final    Eosinophils, Absolute 04/17/2024 0.09  0.00 - 0.50 K/uL Final    Basophils, Absolute 04/17/2024 0.03  0.00 - 0.20 K/uL Final    Immature Granulocytes, Absolute 04/17/2024 0.02  0.00 - 0.04 K/uL Final    nRBC, Absolute 04/17/2024 0.00  <=0.00 x10e3/uL Final    Diff Type 04/17/2024 Auto   Final     X-Ray Hand 3 View Left  Narrative: EXAMINATION:  XR HAND COMPLETE 3 VIEW LEFT    CLINICAL HISTORY:  Displaced fracture of neck of unspecified metacarpal bone, subsequent encounter for fracture with routine healing    COMPARISON:  None available    TECHNIQUE:  XR HAND 3 VIEW LEFT    FINDINGS:  Previous fracture of the distal 5th metacarpal appears similar to previous.  No other evidence of fracture seen.  The alignment of the joints appears normal.  Moderate to severe 1st carpometacarpal joint degenerative change is present.  No soft tissue abnormality is seen.  Impression: Fifth metacarpal fracture appears similar to previous.    Electronically signed by: Braden Kaba  Date:    04/29/2024  Time:    15:04      Assessment and Plan (including Health Maintenance)      Problem List  Smart Sets  Document Outside HM   :    Health Maintenance Due   Topic Date Due    Pneumococcal Vaccines (Age 0-64) (2 of 2 - PCV) 03/12/2005    COVID-19 Vaccine (1 - 2023-24 season) Never done       Problem List Items Addressed This Visit          Psychiatric    Anxiety and depression - Primary    Current Assessment & Plan     Reports symptoms have improved since starting prozac but requests increased dosage  Will try 30 mg BID  Declines counseling at this time  Discussed outlets to help with anxiety and depression         Relevant Medications    FLUoxetine 20  MG capsule       GI    Hx of hepatitis C    Current Assessment & Plan     Reports he has been treated in the past for hep C and declines GI referral at this time               Health Maintenance Topics with due status: Not Due       Topic Last Completion Date    TETANUS VACCINE 03/27/2018    Influenza Vaccine 09/07/2020    Lipid Panel 04/17/2024       Future Appointments   Date Time Provider Department Center   8/22/2024  1:40 PM Jacklyn Wolf FNP [facility]          Signature:  JUAN Sifuentes [facility] [address] [place] [address]  [phone]    Date of encounter: 5/22/24

## 2024-05-29 NOTE — ASSESSMENT & PLAN NOTE
Reports symptoms have improved since starting prozac but requests increased dosage  Will try 30 mg BID  Declines counseling at this time  Discussed outlets to help with anxiety and depression

## 2024-08-22 ENCOUNTER — OFFICE VISIT (OUTPATIENT)
Dept: FAMILY MEDICINE | Facility: CLINIC | Age: 39
End: 2024-08-22
Payer: OTHER GOVERNMENT

## 2024-08-22 VITALS
DIASTOLIC BLOOD PRESSURE: 84 MMHG | BODY MASS INDEX: 23.87 KG/M2 | TEMPERATURE: 98 F | HEIGHT: 74 IN | OXYGEN SATURATION: 97 % | RESPIRATION RATE: 18 BRPM | HEART RATE: 87 BPM | WEIGHT: 186 LBS | SYSTOLIC BLOOD PRESSURE: 128 MMHG

## 2024-08-22 DIAGNOSIS — F43.10 PTSD (POST-TRAUMATIC STRESS DISORDER): ICD-10-CM

## 2024-08-22 DIAGNOSIS — F32.A ANXIETY AND DEPRESSION: Primary | ICD-10-CM

## 2024-08-22 DIAGNOSIS — F41.9 ANXIETY AND DEPRESSION: Primary | ICD-10-CM

## 2024-08-22 PROCEDURE — 99213 OFFICE O/P EST LOW 20 MIN: CPT | Mod: ,,, | Performed by: NURSE PRACTITIONER

## 2024-08-22 RX ORDER — ARIPIPRAZOLE 5 MG/1
5 TABLET ORAL
COMMUNITY
Start: 2024-05-23

## 2024-08-22 RX ORDER — MIRTAZAPINE 30 MG/1
30 TABLET, FILM COATED ORAL
COMMUNITY
Start: 2024-05-23

## 2024-08-22 RX ORDER — PRAZOSIN HYDROCHLORIDE 2 MG/1
2 CAPSULE ORAL
COMMUNITY
Start: 2024-05-23

## 2024-08-22 RX ORDER — BUSPIRONE HYDROCHLORIDE 10 MG/1
TABLET ORAL
COMMUNITY
Start: 2024-05-23 | End: 2025-05-24

## 2024-08-22 NOTE — PROGRESS NOTES
Health Maintenance Due   Topic Date Due    Pneumococcal Vaccines (Age 0-64) (2 of 2 - PCV) 03/12/2005    COVID-19 Vaccine (1 - 2023-24 season) Never done     Discussed care gaps.  Not interested in vaccs.

## 2024-09-02 NOTE — ASSESSMENT & PLAN NOTE
Reports psychology took him off prozac and has started on new meds. States he does not feel they were helping him so he started back taking prozac along with abilify. Reports he has telehealth visit with psychology again next week. States anxiety seems to be better as long as he takes prozac. States he will tell psychology so he can continue current regimen. Recently had labs drawn. Advised him to only continue prozac if psychology feels it is okay with their regimen to do so; patient agrees.   Declines counseling at this time.   Discussed outlets to help with anxiety and depression. Will continue to monitor about 6 mo.

## 2024-09-02 NOTE — PROGRESS NOTES
JUAN Sifuentes   [facility] [address] [place] [address]  [phone]      PATIENT NAME: Garrett Wright  : 1985  DATE: 24  MRN: [id number]      Billing Provider: JUAN Sifuentes  Level of Service:   Patient PCP Information       Provider PCP Type    JUAN Sifuentes General            Reason for Visit / Chief Complaint: Follow-up (3 month fu.) and Anxiety (States his psych took him off prozac but he is still taking it because it works for him.)       Update PCP  Update Chief Complaint         History of Present Illness / Problem Focused Workflow     39 year old male presents for follow up for anxiety. He recently [facility].   Reports he is feeling better but continues to prozac. Denies any other complaints  or problems today.      Review of Systems     Review of Systems   Constitutional:  Positive for fatigue. Negative for fever.   HENT:  Negative for congestion.    Respiratory:  Negative for cough and shortness of breath.    Cardiovascular:  Negative for chest pain.   Gastrointestinal:  Negative for abdominal pain, constipation, diarrhea and vomiting.   Endocrine: Negative for polydipsia and polyuria.   Musculoskeletal:  Positive for arthralgias. Negative for gait problem.   Allergic/Immunologic: Negative for environmental allergies.   Neurological:  Negative for dizziness, weakness and headaches.   Psychiatric/Behavioral:  Positive for dysphoric mood and sleep disturbance. Negative for agitation. The patient is not nervous/anxious.        Medical / Social / Family History     Past Medical History:   Diagnosis Date    Male erectile dysfunction, unspecified     PTSD (post-traumatic stress disorder)        Past Surgical History:   Procedure Laterality Date    ARTHROSCOPIC ACROMIOPLASTY OF SHOULDER Right 2023    Procedure: ACROMIOPLASTY, ARTHROSCOPIC;  Surgeon: Subha  Eb PICKERING MD;  Location: [facility] OR;  Service: Orthopedics;  Laterality: Right;    ARTHROSCOPIC DEBRIDEMENT OF SHOULDER Right 9/21/2023    Procedure: DEBRIDEMENT, SHOULDER, ARTHROSCOPIC;  Surgeon: Eb Mascorro MD;  Location: [facility];  Service: Orthopedics;  Laterality: Right;    ARTHROSCOPIC REPAIR OF ROTATOR CUFF OF SHOULDER Right 9/21/2023    Procedure: REPAIR, ROTATOR CUFF, ARTHROSCOPIC;  Surgeon: Eb Mascorro MD;  Location: [facility];  Service: Orthopedics;  Laterality: Right;    ARTHROSCOPY OF SHOULDER WITH DECOMPRESSION OF SUBACROMIAL SPACE Right 9/21/2023    Procedure: ARTHROSCOPY, SHOULDER, WITH SUBACROMIAL SPACE DECOMPRESSION;  Surgeon: Eb Mascorro MD;  Location: [facility];  Service: Orthopedics;  Laterality: Right;    ARTHROSCOPY OF SHOULDER WITH REMOVAL OF DISTAL CLAVICLE Right 9/21/2023    Procedure: ARTHROSCOPY, SHOULDER, WITH DISTAL CLAVICLE EXCISION;  Surgeon: Eb Mascorro MD;  Location: [facility];  Service: Orthopedics;  Laterality: Right;    SHOULDER ARTHROSCOPY Right 9/21/2023    Procedure: ARTHROSCOPY, SHOULDER;  Surgeon: Eb Mascorro MD;  Location: [facility] OR;  Service: Orthopedics;  Laterality: Right;       Social History    reports that he has been smoking cigarettes. He has never used smokeless tobacco. He reports that he does not currently use alcohol. He reports current drug use. Drug: Marijuana.    Family History  's family history includes Cancer in his mother.    Medications and Allergies     Medications  Outpatient Medications Marked as Taking for the 8/22/24 encounter (Office Visit) with Jacklyn Wolf FNP   Medication Sig Dispense Refill    ARIPiprazole (ABILIFY) 5 MG Tab Take 5 mg by mouth.      busPIRone (BUSPAR) 10 MG tablet TAKE ONE TABLET BY MOUTH 2 TIMES A DAY FOR ANXIETY      FLUoxetine 10 MG capsule Take 1 capsule (10 mg total) by mouth 2 (two) times daily. 90 capsule 1     FLUoxetine 20 MG capsule Take 1 capsule (20 mg total) by mouth 2 (two) times daily. 90 capsule 1    gabapentin (NEURONTIN) 600 MG tablet Take 1 tablet (600 mg total) by mouth nightly. 90 tablet 1    ibuprofen (ADVIL,MOTRIN) 600 MG tablet Take 1 tablet (600 mg total) by mouth every 4 (four) hours as needed for Pain. 90 tablet 1    mirtazapine (REMERON) 30 MG tablet Take 30 mg by mouth.      omeprazole (PRILOSEC) 20 MG capsule Take 1 capsule (20 mg total) by mouth once daily. 90 capsule 1    prazosin (MINIPRESS) 2 MG Cap Take 2 mg by mouth.      sildenafiL (VIAGRA) 100 MG tablet Take 1 tablet (100 mg total) by mouth as needed for Erectile Dysfunction. 30 tablet 2    valACYclovir (VALTREX) 500 MG tablet Take 1 tablet (500 mg total) by mouth once daily. 90 tablet 1       Allergies  Review of patient's allergies indicates:  No Known Allergies    Physical Examination     Vitals:    08/22/24 1358   BP: 128/84   Pulse: 87   Resp: 18   Temp: 97.9 °F (36.6 °C)     Physical Exam  Constitutional:       General: He is not in acute distress.  HENT:      Nose: Nose normal.      Mouth/Throat:      Mouth: Mucous membranes are moist.   Eyes:      Extraocular Movements: Extraocular movements intact.   Cardiovascular:      Rate and Rhythm: Normal rate.   Pulmonary:      Effort: Pulmonary effort is normal. No respiratory distress.   Abdominal:      General: Bowel sounds are normal.      Palpations: Abdomen is soft.      Tenderness: There is no abdominal tenderness.   Musculoskeletal:         General: Normal range of motion.      Cervical back: Normal range of motion.   Skin:     General: Skin is warm.   Neurological:      Mental Status: He is alert.   Psychiatric:         Behavior: Behavior normal.           Imaging / Labs     No visits with results within 1 Day(s) from this visit.   Latest known visit with results is:   Office Visit on 04/17/2024   Component Date Value Ref Range Status    Hepatitis C Ab 04/17/2024 Reactive (A)   Non-Reactive Final    Triglycerides 04/17/2024 135  35 - 150 mg/dL Final    Cholesterol 04/17/2024 157  0 - 200 mg/dL Final    HDL Cholesterol 04/17/2024 69 (H)  40 - 60 mg/dL Final    Cholesterol/HDL Ratio (Risk Factor) 04/17/2024 2.3   Final    Non-HDL 04/17/2024 88  mg/dL Final    LDL Calculated 04/17/2024 61  mg/dL Final    VLDL 04/17/2024 27  mg/dL Final    Hemoglobin A1C 04/17/2024 5.1  4.5 - 6.6 % Final    Estimated Average Glucose 04/17/2024 100  mg/dL Final    HIV 1/2 04/17/2024 Non-Reactive  Non-Reactive Final    Sodium 04/17/2024 140  136 - 145 mmol/L Final    Potassium 04/17/2024 4.2  3.5 - 5.1 mmol/L Final    Chloride 04/17/2024 109 (H)  98 - 107 mmol/L Final    CO2 04/17/2024 26  21 - 32 mmol/L Final    Anion Gap 04/17/2024 9  7 - 16 mmol/L Final    Glucose 04/17/2024 95  74 - 106 mg/dL Final    BUN 04/17/2024 15  7 - 18 mg/dL Final    Creatinine 04/17/2024 1.23  0.70 - 1.30 mg/dL Final    BUN/Creatinine Ratio 04/17/2024 12  6 - 20 Final    Calcium 04/17/2024 9.2  8.5 - 10.1 mg/dL Final    Total Protein 04/17/2024 6.7  6.4 - 8.2 g/dL Final    Albumin 04/17/2024 3.9  3.5 - 5.0 g/dL Final    Globulin 04/17/2024 2.8  2.0 - 4.0 g/dL Final    A/G Ratio 04/17/2024 1.4   Final    Bilirubin, Total 04/17/2024 0.4  >0.0 - 1.2 mg/dL Final    Alk Phos 04/17/2024 98  45 - 115 U/L Final    ALT 04/17/2024 30  16 - 61 U/L Final    AST 04/17/2024 25  15 - 37 U/L Final    eGFR 04/17/2024 77  >=60 mL/min/1.73m2 Final    WBC 04/17/2024 8.02  4.50 - 11.00 K/uL Final    RBC 04/17/2024 4.66  4.60 - 6.20 M/uL Final    Hemoglobin 04/17/2024 14.3  13.5 - 18.0 g/dL Final    Hematocrit 04/17/2024 43.8  40.0 - 54.0 % Final    MCV 04/17/2024 94.0  80.0 - 96.0 fL Final    MCH 04/17/2024 30.7  27.0 - 31.0 pg Final    MCHC 04/17/2024 32.6  32.0 - 36.0 g/dL Final    RDW 04/17/2024 11.9  11.5 - 14.5 % Final    Platelet Count 04/17/2024 289  150 - 400 K/uL Final    MPV 04/17/2024 10.5  9.4 - 12.4 fL Final    Neutrophils % 04/17/2024 75.0  (H)  53.0 - 65.0 % Final    Lymphocytes % 04/17/2024 15.7 (L)  27.0 - 41.0 % Final    Monocytes % 04/17/2024 7.6 (H)  2.0 - 6.0 % Final    Eosinophils % 04/17/2024 1.1  1.0 - 4.0 % Final    Basophils % 04/17/2024 0.4  0.0 - 1.0 % Final    Immature Granulocytes % 04/17/2024 0.2  0.0 - 0.4 % Final    nRBC, Auto 04/17/2024 0.0  <=0.0 % Final    Neutrophils, Abs 04/17/2024 6.01  1.80 - 7.70 K/uL Final    Lymphocytes, Absolute 04/17/2024 1.26  1.00 - 4.80 K/uL Final    Monocytes, Absolute 04/17/2024 0.61  0.00 - 0.80 K/uL Final    Eosinophils, Absolute 04/17/2024 0.09  0.00 - 0.50 K/uL Final    Basophils, Absolute 04/17/2024 0.03  0.00 - 0.20 K/uL Final    Immature Granulocytes, Absolute 04/17/2024 0.02  0.00 - 0.04 K/uL Final    nRBC, Absolute 04/17/2024 0.00  <=0.00 x10e3/uL Final    Diff Type 04/17/2024 Auto   Final     X-Ray Hand 3 View Left  Narrative: EXAMINATION:  XR HAND COMPLETE 3 VIEW LEFT    CLINICAL HISTORY:  Displaced fracture of neck of unspecified metacarpal bone, subsequent encounter for fracture with routine healing    COMPARISON:  None available    TECHNIQUE:  XR HAND 3 VIEW LEFT    FINDINGS:  Previous fracture of the distal 5th metacarpal appears similar to previous.  No other evidence of fracture seen.  The alignment of the joints appears normal.  Moderate to severe 1st carpometacarpal joint degenerative change is present.  No soft tissue abnormality is seen.  Impression: Fifth metacarpal fracture appears similar to previous.    Electronically signed by: Braden Kaba  Date:    04/29/2024  Time:    15:04      Assessment and Plan (including Health Maintenance)      Problem List  Smart Sets  Document Outside HM   :    Health Maintenance Due   Topic Date Due    Pneumococcal Vaccines (Age 0-64) (2 of 2 - PCV) 03/12/2005    Influenza Vaccine (1) 09/01/2024    COVID-19 Vaccine (1 - 2023-24 season) Never done       Problem List Items Addressed This Visit          Psychiatric    PTSD (post-traumatic stress  disorder)    Anxiety and depression - Primary    Current Assessment & Plan     Reports psychology took him off prozac and has started on new meds. States he does not feel they were helping him so he started back taking prozac along with abilify. Reports he has telehealth visit with psychology again next week. States anxiety seems to be better as long as he takes prozac. States he will tell psychology so he can continue current regimen. Recently had labs drawn. Advised him to only continue prozac if psychology feels it is okay with their regimen to do so; patient agrees.   Declines counseling at this time.   Discussed outlets to help with anxiety and depression. Will continue to monitor about 6 mo.            Health Maintenance Topics with due status: Not Due       Topic Last Completion Date    TETANUS VACCINE 03/27/2018    Lipid Panel 04/17/2024       Future Appointments   Date Time Provider Department Center   11/26/2024  9:40 AM Jacklyn Wolf FNP [facility]          Signature:  [facility] [address] [place] [address]  [phone]    Date of encounter: 8/22/24

## 2024-09-09 DIAGNOSIS — F32.A ANXIETY AND DEPRESSION: ICD-10-CM

## 2024-09-09 DIAGNOSIS — B34.9 VIRAL DISEASE: ICD-10-CM

## 2024-09-09 DIAGNOSIS — F41.9 ANXIETY AND DEPRESSION: ICD-10-CM

## 2024-09-09 RX ORDER — VALACYCLOVIR HYDROCHLORIDE 500 MG/1
500 TABLET, FILM COATED ORAL DAILY
Qty: 90 TABLET | Refills: 1 | Status: SHIPPED | OUTPATIENT
Start: 2024-09-09 | End: 2025-09-10

## 2024-09-09 RX ORDER — FLUOXETINE 10 MG/1
10 CAPSULE ORAL 2 TIMES DAILY
Qty: 180 CAPSULE | Refills: 1 | Status: SHIPPED | OUTPATIENT
Start: 2024-09-09 | End: 2025-09-09

## 2024-09-25 ENCOUNTER — HOSPITAL ENCOUNTER (EMERGENCY)
Facility: HOSPITAL | Age: 39
Discharge: HOME | End: 2024-09-25
Attending: EMERGENCY MEDICINE
Payer: OTHER GOVERNMENT

## 2024-09-25 VITALS
TEMPERATURE: 99 F | HEIGHT: 74 IN | DIASTOLIC BLOOD PRESSURE: 63 MMHG | SYSTOLIC BLOOD PRESSURE: 107 MMHG | HEART RATE: 89 BPM | BODY MASS INDEX: 25.03 KG/M2 | OXYGEN SATURATION: 95 % | WEIGHT: 195 LBS | RESPIRATION RATE: 10 BRPM
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
## 2024-09-25 LAB
ALBUMIN SERPL BCP-MCNC: 3.1 G/DL (ref 3.5–5)
ALBUMIN/GLOB SERPL: 1.2 {RATIO}
ALP SERPL-CCNC: 68 U/L (ref 45–115)
ALT SERPL W P-5'-P-CCNC: 15 U/L (ref 16–61)
ANION GAP SERPL CALCULATED.3IONS-SCNC: 6 MMOL/L (ref 7–16)
AST SERPL W P-5'-P-CCNC: 9 U/L (ref 15–37)
BASOPHILS # BLD AUTO: 0.06 K/UL (ref 0–0.2)
BASOPHILS NFR BLD AUTO: 0.9 % (ref 0–1)
BILIRUB SERPL-MCNC: 0.3 MG/DL (ref ?–1.2)
BUN SERPL-MCNC: 22 MG/DL (ref 7–18)
BUN/CREAT SERPL: 19 (ref 6–20)
CALCIUM SERPL-MCNC: 8.2 MG/DL (ref 8.5–10.1)
CHLORIDE SERPL-SCNC: 109 MMOL/L (ref 98–107)
CO2 SERPL-SCNC: 29 MMOL/L (ref 21–32)
CREAT SERPL-MCNC: 1.17 MG/DL (ref 0.7–1.3)
DIFFERENTIAL METHOD BLD: ABNORMAL
EGFR (NO RACE VARIABLE) (RUSH/TITUS): 81 ML/MIN/1.73M2
EOSINOPHIL # BLD AUTO: 0.3 K/UL (ref 0–0.5)
EOSINOPHIL NFR BLD AUTO: 4.6 % (ref 1–4)
ERYTHROCYTE [DISTWIDTH] IN BLOOD BY AUTOMATED COUNT: 12 % (ref 11.5–14.5)
GLOBULIN SER-MCNC: 2.6 G/DL (ref 2–4)
GLUCOSE SERPL-MCNC: 89 MG/DL (ref 74–106)
HCT VFR BLD AUTO: 40.1 % (ref 40–54)
HGB BLD-MCNC: 13 G/DL (ref 13.5–18)
IMM GRANULOCYTES # BLD AUTO: 0.01 K/UL (ref 0–0.04)
IMM GRANULOCYTES NFR BLD: 0.2 % (ref 0–0.4)
INR BLD: 1
LYMPHOCYTES # BLD AUTO: 1.77 K/UL (ref 1–4.8)
LYMPHOCYTES NFR BLD AUTO: 27.1 % (ref 27–41)
MCH RBC QN AUTO: 30.6 PG (ref 27–31)
MCHC RBC AUTO-ENTMCNC: 32.4 G/DL (ref 32–36)
MCV RBC AUTO: 94.4 FL (ref 80–96)
MONOCYTES # BLD AUTO: 0.69 K/UL (ref 0–0.8)
MONOCYTES NFR BLD AUTO: 10.6 % (ref 2–6)
MPC BLD CALC-MCNC: 9.8 FL (ref 9.4–12.4)
NEUTROPHILS # BLD AUTO: 3.71 K/UL (ref 1.8–7.7)
NEUTROPHILS NFR BLD AUTO: 56.6 % (ref 53–65)
NRBC # BLD AUTO: 0 X10E3/UL
NRBC, AUTO (.00): 0 %
PLATELET # BLD AUTO: 241 K/UL (ref 150–400)
POTASSIUM SERPL-SCNC: 3.4 MMOL/L (ref 3.5–5.1)
PROT SERPL-MCNC: 5.7 G/DL (ref 6.4–8.2)
PROTHROMBIN TIME: 13.1 SECONDS (ref 11.7–14.7)
RBC # BLD AUTO: 4.25 M/UL (ref 4.6–6.2)
SODIUM SERPL-SCNC: 141 MMOL/L (ref 136–145)
WBC # BLD AUTO: 6.54 K/UL (ref 4.5–11)

## 2024-09-25 PROCEDURE — 99284 EMERGENCY DEPT VISIT MOD MDM: CPT | Mod: 25

## 2024-09-25 PROCEDURE — 63600175 PHARM REV CODE 636 W HCPCS: Performed by: EMERGENCY MEDICINE

## 2024-09-25 PROCEDURE — 85610 PROTHROMBIN TIME: CPT | Performed by: EMERGENCY MEDICINE

## 2024-09-25 PROCEDURE — 85025 COMPLETE CBC W/AUTO DIFF WBC: CPT | Performed by: EMERGENCY MEDICINE

## 2024-09-25 PROCEDURE — 96375 TX/PRO/DX INJ NEW DRUG ADDON: CPT

## 2024-09-25 PROCEDURE — 80053 COMPREHEN METABOLIC PANEL: CPT | Performed by: EMERGENCY MEDICINE

## 2024-09-25 PROCEDURE — 90715 TDAP VACCINE 7 YRS/> IM: CPT | Performed by: EMERGENCY MEDICINE

## 2024-09-25 PROCEDURE — 90471 IMMUNIZATION ADMIN: CPT | Performed by: EMERGENCY MEDICINE

## 2024-09-25 PROCEDURE — G0390 TRAUMA RESPONS W/HOSP CRITI: HCPCS

## 2024-09-25 PROCEDURE — 25000003 PHARM REV CODE 250: Performed by: EMERGENCY MEDICINE

## 2024-09-25 PROCEDURE — 96365 THER/PROPH/DIAG IV INF INIT: CPT

## 2024-09-25 RX ORDER — HYDROCODONE BITARTRATE AND ACETAMINOPHEN 5; 325 MG/1; MG/1
1 TABLET ORAL EVERY 6 HOURS PRN
Qty: 12 TABLET | Refills: 0 | Status: SHIPPED | OUTPATIENT
Start: 2024-09-25 | End: 2024-09-28

## 2024-09-25 RX ORDER — HYDROCODONE BITARTRATE AND ACETAMINOPHEN 5; 325 MG/1; MG/1
1 TABLET ORAL EVERY 6 HOURS PRN
Qty: 12 TABLET | Refills: 0 | Status: SHIPPED | OUTPATIENT
Start: 2024-09-25 | End: 2024-09-25

## 2024-09-25 RX ORDER — ONDANSETRON HYDROCHLORIDE 2 MG/ML
4 INJECTION, SOLUTION INTRAVENOUS
Status: COMPLETED | OUTPATIENT
Start: 2024-09-25 | End: 2024-09-25

## 2024-09-25 RX ORDER — MORPHINE SULFATE 4 MG/ML
4 INJECTION, SOLUTION INTRAMUSCULAR; INTRAVENOUS
Status: COMPLETED | OUTPATIENT
Start: 2024-09-25 | End: 2024-09-25

## 2024-09-25 RX ORDER — CEPHALEXIN 500 MG/1
500 CAPSULE ORAL EVERY 6 HOURS
Qty: 40 CAPSULE | Refills: 0 | Status: SHIPPED | OUTPATIENT
Start: 2024-09-25 | End: 2024-10-05

## 2024-09-25 RX ORDER — CEPHALEXIN 500 MG/1
500 CAPSULE ORAL EVERY 6 HOURS
Qty: 40 CAPSULE | Refills: 0 | Status: SHIPPED | OUTPATIENT
Start: 2024-09-25 | End: 2024-09-25

## 2024-09-25 RX ADMIN — ONDANSETRON 4 MG: 2 INJECTION INTRAMUSCULAR; INTRAVENOUS at 04:09

## 2024-09-25 RX ADMIN — DEXTROSE MONOHYDRATE 1 G: 5 INJECTION INTRAVENOUS at 04:09

## 2024-09-25 RX ADMIN — TETANUS TOXOID, REDUCED DIPHTHERIA TOXOID AND ACELLULAR PERTUSSIS VACCINE, ADSORBED 0.5 ML: 5; 2.5; 8; 8; 2.5 SUSPENSION INTRAMUSCULAR at 04:09

## 2024-09-25 RX ADMIN — MORPHINE SULFATE 4 MG: 4 INJECTION, SOLUTION INTRAMUSCULAR; INTRAVENOUS at 04:09
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
## 2024-09-25 NOTE — Clinical Note
"Garrett Saenz" Kyel was seen and treated in our emergency department on 9/25/2024.  He may return to work on 10/01/2024.       If you have any questions or concerns, please don't hesitate to call.      ZULLY Oneil RN    "
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
## 2024-09-26 ENCOUNTER — TELEPHONE (OUTPATIENT)
Dept: EMERGENCY MEDICINE | Facility: HOSPITAL | Age: 39
End: 2024-09-26
Payer: OTHER GOVERNMENT

## 2024-10-02 ENCOUNTER — TELEPHONE (OUTPATIENT)
Dept: SURGERY | Facility: CLINIC | Age: 39
End: 2024-10-02
Payer: OTHER GOVERNMENT

## 2024-10-10 ENCOUNTER — OFFICE VISIT (OUTPATIENT)
Dept: SURGERY | Facility: CLINIC | Age: 39
End: 2024-10-10
Attending: SURGERY
Payer: OTHER GOVERNMENT
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
## 2024-10-10 PROCEDURE — 99213 OFFICE O/P EST LOW 20 MIN: CPT | Mod: PBBFAC | Performed by: SURGERY

## 2024-10-10 PROCEDURE — 99999 PR PBB SHADOW E&M-EST. PATIENT-LVL III: CPT | Mod: PBBFAC,,, | Performed by: SURGERY
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
## 2024-10-23 DIAGNOSIS — K21.9 GASTROESOPHAGEAL REFLUX DISEASE, UNSPECIFIED WHETHER ESOPHAGITIS PRESENT: ICD-10-CM

## 2024-10-23 RX ORDER — OMEPRAZOLE 20 MG/1
20 CAPSULE, DELAYED RELEASE ORAL DAILY
Qty: 90 CAPSULE | Refills: 1 | Status: SHIPPED | OUTPATIENT
Start: 2024-10-23

## 2024-10-23 NOTE — TELEPHONE ENCOUNTER
----- Message from Eloisa sent at 10/23/2024  8:32 AM CDT -----  PT is looking to [facility]...omeprazole (PRILOSEC) 20 MG capsule.

## 2024-11-04 ENCOUNTER — OFFICE VISIT (OUTPATIENT)
Dept: FAMILY MEDICINE | Facility: CLINIC | Age: 39
End: 2024-11-04
Payer: OTHER GOVERNMENT

## 2024-11-04 VITALS
DIASTOLIC BLOOD PRESSURE: 84 MMHG | TEMPERATURE: 98 F | BODY MASS INDEX: 26.39 KG/M2 | RESPIRATION RATE: 18 BRPM | HEART RATE: 86 BPM | OXYGEN SATURATION: 97 % | HEIGHT: 74 IN | WEIGHT: 205.63 LBS | SYSTOLIC BLOOD PRESSURE: 136 MMHG

## 2024-11-04 DIAGNOSIS — S81.832A GUNSHOT WOUND OF LEFT LOWER LEG, INITIAL ENCOUNTER: Primary | ICD-10-CM

## 2024-11-04 DIAGNOSIS — L03.116 CELLULITIS OF LEFT LOWER EXTREMITY: ICD-10-CM

## 2024-11-04 PROBLEM — Z76.0 ENCOUNTER FOR MEDICATION REFILL: Status: RESOLVED | Noted: 2023-10-31 | Resolved: 2024-11-04

## 2024-11-04 PROCEDURE — 96372 THER/PROPH/DIAG INJ SC/IM: CPT | Mod: ,,, | Performed by: NURSE PRACTITIONER

## 2024-11-04 PROCEDURE — 87070 CULTURE OTHR SPECIMN AEROBIC: CPT | Mod: ,,, | Performed by: CLINICAL MEDICAL LABORATORY

## 2024-11-04 PROCEDURE — 87077 CULTURE AEROBIC IDENTIFY: CPT | Mod: ,,, | Performed by: CLINICAL MEDICAL LABORATORY

## 2024-11-04 PROCEDURE — 87186 SC STD MICRODIL/AGAR DIL: CPT | Mod: ,,, | Performed by: CLINICAL MEDICAL LABORATORY

## 2024-11-04 PROCEDURE — 99213 OFFICE O/P EST LOW 20 MIN: CPT | Mod: 25,,, | Performed by: NURSE PRACTITIONER

## 2024-11-04 RX ORDER — SULINDAC 200 MG/1
200 TABLET ORAL 2 TIMES DAILY
COMMUNITY
Start: 2024-10-11

## 2024-11-04 RX ORDER — CEFTRIAXONE 1 G/1
1 INJECTION, POWDER, FOR SOLUTION INTRAMUSCULAR; INTRAVENOUS
Status: COMPLETED | OUTPATIENT
Start: 2024-11-04 | End: 2024-11-04

## 2024-11-04 RX ORDER — SILVER SULFADIAZINE 10 G/1000G
CREAM TOPICAL 2 TIMES DAILY
Qty: 50 G | Refills: 1 | Status: SHIPPED | OUTPATIENT
Start: 2024-11-04

## 2024-11-04 RX ADMIN — CEFTRIAXONE 1 G: 1 INJECTION, POWDER, FOR SOLUTION INTRAMUSCULAR; INTRAVENOUS at 03:11

## 2024-11-04 NOTE — PROGRESS NOTES
Health Maintenance Due   Topic Date Due    Pneumococcal Vaccines (Age 0-64) (2 of 2 - PCV) 03/12/2005    Influenza Vaccine (1) 09/01/2024    COVID-19 Vaccine (1 - 2024-25 season) Never done     Discussed care gaps with pt   Pt is not interested in any vaccines today
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
## 2024-11-06 DIAGNOSIS — L03.116 CELLULITIS OF LEFT LOWER EXTREMITY: Primary | ICD-10-CM

## 2024-11-06 DIAGNOSIS — A49.8 PSEUDOMONAS AERUGINOSA INFECTION: ICD-10-CM

## 2024-11-06 LAB — MICROORGANISM SPEC CULT: ABNORMAL

## 2024-11-06 RX ORDER — CIPROFLOXACIN 500 MG/1
500 TABLET ORAL EVERY 12 HOURS
Qty: 10 TABLET | Refills: 0 | Status: SHIPPED | OUTPATIENT
Start: 2024-11-06

## 2024-11-06 RX ORDER — ACETIC ACID 3 %
1 LIQUID (ML) MISCELLANEOUS DAILY
Qty: 500 ML | Refills: 1 | Status: SHIPPED | OUTPATIENT
Start: 2024-11-06

## 2024-11-06 RX ORDER — GENTAMICIN SULFATE 1 MG/G
CREAM TOPICAL 2 TIMES DAILY
Qty: 30 G | Refills: 2 | Status: SHIPPED | OUTPATIENT
Start: 2024-11-06 | End: 2024-11-06

## 2024-11-06 NOTE — PROGRESS NOTES
Discussed wound culture results and recommendations with pt via phone  No noted concerns  Voiced understanding

## 2024-11-11 ENCOUNTER — OFFICE VISIT (OUTPATIENT)
Dept: FAMILY MEDICINE | Facility: CLINIC | Age: 39
End: 2024-11-11
Payer: OTHER GOVERNMENT

## 2024-11-11 VITALS
OXYGEN SATURATION: 97 % | BODY MASS INDEX: 25.8 KG/M2 | RESPIRATION RATE: 18 BRPM | WEIGHT: 201 LBS | TEMPERATURE: 98 F | DIASTOLIC BLOOD PRESSURE: 86 MMHG | HEART RATE: 92 BPM | HEIGHT: 74 IN | SYSTOLIC BLOOD PRESSURE: 138 MMHG

## 2024-11-11 DIAGNOSIS — A49.8 PSEUDOMONAS AERUGINOSA INFECTION: ICD-10-CM

## 2024-11-11 DIAGNOSIS — S81.832A GUNSHOT WOUND OF LEFT LOWER LEG, INITIAL ENCOUNTER: Primary | ICD-10-CM

## 2024-11-11 DIAGNOSIS — L03.116 CELLULITIS OF LEFT LOWER EXTREMITY: ICD-10-CM

## 2024-11-11 PROCEDURE — 99213 OFFICE O/P EST LOW 20 MIN: CPT | Mod: ,,, | Performed by: NURSE PRACTITIONER

## 2024-11-11 RX ORDER — SULFAMETHOXAZOLE AND TRIMETHOPRIM 800; 160 MG/1; MG/1
1 TABLET ORAL DAILY
Qty: 14 TABLET | Refills: 0 | Status: SHIPPED | OUTPATIENT
Start: 2024-11-11

## 2024-11-11 NOTE — PROGRESS NOTES
Health Maintenance Due   Topic Date Due    Pneumococcal Vaccines (Age 0-64) (2 of 2 - PCV) 03/12/2005    COVID-19 Vaccine (1 - 2024-25 season) Never done     Discussed care gaps.  Not interested in vaccs.
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
## 2024-12-03 DIAGNOSIS — G62.9 NEUROPATHY: ICD-10-CM

## 2024-12-03 RX ORDER — GABAPENTIN 600 MG/1
600 TABLET ORAL NIGHTLY
Qty: 90 TABLET | Refills: 1 | Status: SHIPPED | OUTPATIENT
Start: 2024-12-03 | End: 2025-12-03

## 2025-02-12 ENCOUNTER — OFFICE VISIT (OUTPATIENT)
Dept: FAMILY MEDICINE | Facility: CLINIC | Age: 40
End: 2025-02-12
Payer: OTHER GOVERNMENT

## 2025-02-12 VITALS
HEART RATE: 110 BPM | BODY MASS INDEX: 26.77 KG/M2 | RESPIRATION RATE: 18 BRPM | HEIGHT: 74 IN | OXYGEN SATURATION: 98 % | WEIGHT: 208.63 LBS | DIASTOLIC BLOOD PRESSURE: 82 MMHG | TEMPERATURE: 97 F | SYSTOLIC BLOOD PRESSURE: 138 MMHG

## 2025-02-12 DIAGNOSIS — L03.116 CELLULITIS OF LEFT LOWER EXTREMITY: ICD-10-CM

## 2025-02-12 DIAGNOSIS — S61.402A OPEN WOUND OF LEFT HAND, FOREIGN BODY PRESENCE UNSPECIFIED, UNSPECIFIED WOUND TYPE, INITIAL ENCOUNTER: ICD-10-CM

## 2025-02-12 DIAGNOSIS — S69.92XA INJURY OF LEFT HAND, INITIAL ENCOUNTER: Primary | ICD-10-CM

## 2025-02-12 RX ORDER — SILVER SULFADIAZINE 10 G/1000G
CREAM TOPICAL 2 TIMES DAILY
Qty: 50 G | Refills: 1 | Status: SHIPPED | OUTPATIENT
Start: 2025-02-12

## 2025-02-12 RX ORDER — CEFTRIAXONE 1 G/1
1 INJECTION, POWDER, FOR SOLUTION INTRAMUSCULAR; INTRAVENOUS
Status: COMPLETED | OUTPATIENT
Start: 2025-02-12 | End: 2025-02-12

## 2025-02-12 RX ORDER — CEPHALEXIN 500 MG/1
500 CAPSULE ORAL EVERY 12 HOURS
Qty: 20 CAPSULE | Refills: 0 | Status: SHIPPED | OUTPATIENT
Start: 2025-02-12

## 2025-02-12 RX ADMIN — CEFTRIAXONE 1 G: 1 INJECTION, POWDER, FOR SOLUTION INTRAMUSCULAR; INTRAVENOUS at 03:02
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
## 2025-03-18 DIAGNOSIS — B34.9 VIRAL DISEASE: ICD-10-CM

## 2025-03-18 RX ORDER — VALACYCLOVIR HYDROCHLORIDE 500 MG/1
500 TABLET, FILM COATED ORAL DAILY
Qty: 90 TABLET | Refills: 1 | Status: SHIPPED | OUTPATIENT
Start: 2025-03-18 | End: 2026-03-19

## 2025-03-30 DIAGNOSIS — N52.9 ERECTILE DYSFUNCTION, UNSPECIFIED ERECTILE DYSFUNCTION TYPE: ICD-10-CM

## 2025-04-01 RX ORDER — SILDENAFIL 100 MG/1
100 TABLET, FILM COATED ORAL
Qty: 30 TABLET | Refills: 0 | Status: SHIPPED | OUTPATIENT
Start: 2025-04-01

## 2025-04-28 ENCOUNTER — TELEPHONE (OUTPATIENT)
Dept: FAMILY MEDICINE | Facility: CLINIC | Age: 40
End: 2025-04-28
Payer: OTHER GOVERNMENT

## 2025-04-28 DIAGNOSIS — F41.9 ANXIETY AND DEPRESSION: Primary | ICD-10-CM

## 2025-04-28 DIAGNOSIS — F43.10 PTSD (POST-TRAUMATIC STRESS DISORDER): ICD-10-CM

## 2025-04-28 DIAGNOSIS — F32.A ANXIETY AND DEPRESSION: Primary | ICD-10-CM

## 2025-04-28 NOTE — TELEPHONE ENCOUNTER
Pt has requested a new referral for psychiatry . The last [facility] was never [facility] or approved by [facility] . I have completed a new [facility] community care form and had provider sign it . I will fax everything to [facility] today today to hopefully get a [facility] approval for the [facility]

## 2025-06-12 ENCOUNTER — OFFICE VISIT (OUTPATIENT)
Dept: FAMILY MEDICINE | Facility: CLINIC | Age: 40
End: 2025-06-12
Payer: OTHER GOVERNMENT

## 2025-06-12 VITALS
DIASTOLIC BLOOD PRESSURE: 84 MMHG | RESPIRATION RATE: 18 BRPM | BODY MASS INDEX: 26.67 KG/M2 | TEMPERATURE: 99 F | SYSTOLIC BLOOD PRESSURE: 128 MMHG | HEART RATE: 105 BPM | OXYGEN SATURATION: 98 % | WEIGHT: 207.81 LBS | HEIGHT: 74 IN

## 2025-06-12 DIAGNOSIS — N52.9 ERECTILE DYSFUNCTION, UNSPECIFIED ERECTILE DYSFUNCTION TYPE: ICD-10-CM

## 2025-06-12 DIAGNOSIS — S01.301A OPEN WOUND OF RIGHT EAR, UNSPECIFIED OPEN WOUND TYPE, INITIAL ENCOUNTER: ICD-10-CM

## 2025-06-12 DIAGNOSIS — H60.11 CELLULITIS OF RIGHT EAR: Primary | ICD-10-CM

## 2025-06-12 PROCEDURE — 96372 THER/PROPH/DIAG INJ SC/IM: CPT | Mod: ,,, | Performed by: NURSE PRACTITIONER

## 2025-06-12 PROCEDURE — 99213 OFFICE O/P EST LOW 20 MIN: CPT | Mod: 25,,, | Performed by: NURSE PRACTITIONER

## 2025-06-12 RX ORDER — CEPHALEXIN 500 MG/1
500 CAPSULE ORAL EVERY 6 HOURS
Qty: 30 CAPSULE | Refills: 0 | Status: SHIPPED | OUTPATIENT
Start: 2025-06-12

## 2025-06-12 RX ORDER — SILDENAFIL 100 MG/1
100 TABLET, FILM COATED ORAL
Qty: 30 TABLET | Refills: 1 | Status: SHIPPED | OUTPATIENT
Start: 2025-06-12

## 2025-06-12 RX ORDER — ESCITALOPRAM OXALATE 5 MG/1
15 TABLET ORAL
COMMUNITY
Start: 2025-06-03

## 2025-06-12 RX ORDER — FAMOTIDINE 20 MG/1
20 TABLET, FILM COATED ORAL DAILY
COMMUNITY
Start: 2025-06-03

## 2025-06-12 RX ORDER — GENTAMICIN SULFATE 1 MG/G
OINTMENT TOPICAL 3 TIMES DAILY
Qty: 30 G | Refills: 1 | Status: SHIPPED | OUTPATIENT
Start: 2025-06-12

## 2025-06-12 RX ORDER — NALOXONE HYDROCHLORIDE 4 MG/.1ML
1 SPRAY NASAL ONCE
COMMUNITY
Start: 2025-06-03

## 2025-06-12 RX ORDER — CEFTRIAXONE 1 G/1
1 INJECTION, POWDER, FOR SOLUTION INTRAMUSCULAR; INTRAVENOUS
Status: COMPLETED | OUTPATIENT
Start: 2025-06-12 | End: 2025-06-12

## 2025-06-12 RX ADMIN — CEFTRIAXONE 1 G: 1 INJECTION, POWDER, FOR SOLUTION INTRAMUSCULAR; INTRAVENOUS at 11:06
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
## 2025-08-11 ENCOUNTER — TELEPHONE (OUTPATIENT)
Dept: FAMILY MEDICINE | Facility: CLINIC | Age: 40
End: 2025-08-11
Payer: OTHER GOVERNMENT

## 2025-08-15 DIAGNOSIS — N52.9 ERECTILE DYSFUNCTION, UNSPECIFIED ERECTILE DYSFUNCTION TYPE: ICD-10-CM

## 2025-08-18 RX ORDER — SILDENAFIL 100 MG/1
100 TABLET, FILM COATED ORAL
Qty: 30 TABLET | Refills: 1 | Status: SHIPPED | OUTPATIENT
Start: 2025-08-18

## (undated) DEVICE — GLOVE BIOGEL SKINSENSE PI 6.5

## (undated) DEVICE — NDL SCORPIAN SUTURE PASSER

## (undated) DEVICE — PROBE SERFAS ENERGY 90S CRSE

## (undated) DEVICE — DEVICE SUCTION H20 BROOM 12FT

## (undated) DEVICE — SPONGE COTTON TRAY 4X4IN

## (undated) DEVICE — SOL NACL IRR 3000ML

## (undated) DEVICE — Device

## (undated) DEVICE — GLOVE 8 PROTEXIS PI BLUE

## (undated) DEVICE — APPLICATOR CHLORAPREP ORN 26ML

## (undated) DEVICE — GLOVE 6.0 PROTEXIS PI BLUE

## (undated) DEVICE — CANNULA ARTHRO 8.25MM X 7CM

## (undated) DEVICE — GOWN POLY REINF BRTH SLV XL

## (undated) DEVICE — GLOVE 7.0 PROTEXIS PI BLUE

## (undated) DEVICE — ELECTRODE REM PLYHSV RETURN 9

## (undated) DEVICE — SHAVER TOMCAT 4.0

## (undated) DEVICE — SUT SUTURETAPE TIGERLINK 1.3MM

## (undated) DEVICE — GLOVE 7.5 PROTEXIS PI BLUE

## (undated) DEVICE — TUBING CROSSFLOW INFLOW CASS

## (undated) DEVICE — CANISTER SUCTION MEDI-VAC 12L

## (undated) DEVICE — NDL SUREFIRE SCORPION RC

## (undated) DEVICE — GLOVE BIOGEL SKINSENSE PI 7.5

## (undated) DEVICE — SYR 10CC LUER LOCK

## (undated) DEVICE — GLOVE BIOGEL SKINSENSE PI 6.0

## (undated) DEVICE — DRAPE INCISE IOBAN 2 13X13IN

## (undated) DEVICE — KIT SHLDR POMIERSKIWATSON RUSH

## (undated) DEVICE — BUR FORMULA BRL 12 FLUTE 5.5MM